# Patient Record
Sex: FEMALE | Race: WHITE | Employment: OTHER | URBAN - METROPOLITAN AREA
[De-identification: names, ages, dates, MRNs, and addresses within clinical notes are randomized per-mention and may not be internally consistent; named-entity substitution may affect disease eponyms.]

---

## 2018-10-09 PROBLEM — M54.9 BACK PAIN: Status: ACTIVE | Noted: 2018-10-09

## 2018-10-09 PROBLEM — I10 HYPERTENSION: Status: ACTIVE | Noted: 2018-10-09

## 2018-10-09 PROBLEM — N39.0 LOWER URINARY TRACT INFECTION: Status: ACTIVE | Noted: 2018-10-09

## 2018-10-09 PROBLEM — M19.90 DEGENERATIVE ARTHRITIS: Status: ACTIVE | Noted: 2018-10-09

## 2018-10-09 PROBLEM — E78.5 HYPERLIPIDEMIA: Status: ACTIVE | Noted: 2018-10-09

## 2018-10-09 PROBLEM — Z78.0 ASYMPTOMATIC POSTMENOPAUSAL STATE: Status: ACTIVE | Noted: 2018-10-09

## 2018-10-09 PROBLEM — R10.30 GROIN PAIN: Status: ACTIVE | Noted: 2018-10-09

## 2018-10-09 PROBLEM — R31.29 MICROSCOPIC HEMATURIA: Status: ACTIVE | Noted: 2018-10-09

## 2018-10-09 PROBLEM — F41.1 GAD (GENERALIZED ANXIETY DISORDER): Status: ACTIVE | Noted: 2018-10-09

## 2018-10-09 PROBLEM — M85.80 OSTEOPENIA: Status: ACTIVE | Noted: 2018-10-09

## 2018-10-09 RX ORDER — SERTRALINE HYDROCHLORIDE 100 MG/1
TABLET, FILM COATED ORAL DAILY
COMMUNITY
End: 2018-10-10 | Stop reason: ALTCHOICE

## 2018-10-09 RX ORDER — OMEPRAZOLE 20 MG/1
20 CAPSULE, DELAYED RELEASE ORAL DAILY
COMMUNITY
End: 2021-01-05 | Stop reason: SDUPTHER

## 2018-10-09 RX ORDER — LOSARTAN POTASSIUM 25 MG/1
TABLET ORAL DAILY
COMMUNITY
End: 2018-10-10 | Stop reason: SDUPTHER

## 2018-10-10 ENCOUNTER — OFFICE VISIT (OUTPATIENT)
Dept: FAMILY MEDICINE CLINIC | Age: 63
End: 2018-10-10

## 2018-10-10 VITALS
BODY MASS INDEX: 31.23 KG/M2 | HEART RATE: 77 BPM | HEIGHT: 67 IN | TEMPERATURE: 98.9 F | DIASTOLIC BLOOD PRESSURE: 84 MMHG | OXYGEN SATURATION: 96 % | WEIGHT: 199 LBS | SYSTOLIC BLOOD PRESSURE: 145 MMHG

## 2018-10-10 DIAGNOSIS — I10 ESSENTIAL HYPERTENSION: Primary | ICD-10-CM

## 2018-10-10 RX ORDER — LOSARTAN POTASSIUM 50 MG/1
50 TABLET ORAL DAILY
Qty: 90 TAB | Refills: 1 | Status: SHIPPED | OUTPATIENT
Start: 2018-10-10 | End: 2019-03-26 | Stop reason: SDUPTHER

## 2018-10-10 NOTE — PATIENT INSTRUCTIONS
High Blood Pressure: Care Instructions  Your Care Instructions    If your blood pressure is usually above 130/80, you have high blood pressure, or hypertension. That means the top number is 130 or higher or the bottom number is 80 or higher, or both. Despite what a lot of people think, high blood pressure usually doesn't cause headaches or make you feel dizzy or lightheaded. It usually has no symptoms. But it does increase your risk for heart attack, stroke, and kidney or eye damage. The higher your blood pressure, the more your risk increases. Your doctor will give you a goal for your blood pressure. Your goal will be based on your health and your age. Lifestyle changes, such as eating healthy and being active, are always important to help lower blood pressure. You might also take medicine to reach your blood pressure goal.  Follow-up care is a key part of your treatment and safety. Be sure to make and go to all appointments, and call your doctor if you are having problems. It's also a good idea to know your test results and keep a list of the medicines you take. How can you care for yourself at home? Medical treatment  · If you stop taking your medicine, your blood pressure will go back up. You may take one or more types of medicine to lower your blood pressure. Be safe with medicines. Take your medicine exactly as prescribed. Call your doctor if you think you are having a problem with your medicine. · Talk to your doctor before you start taking aspirin every day. Aspirin can help certain people lower their risk of a heart attack or stroke. But taking aspirin isn't right for everyone, because it can cause serious bleeding. · See your doctor regularly. You may need to see the doctor more often at first or until your blood pressure comes down. · If you are taking blood pressure medicine, talk to your doctor before you take decongestants or anti-inflammatory medicine, such as ibuprofen.  Some of these medicines can raise blood pressure. · Learn how to check your blood pressure at home. Lifestyle changes  · Stay at a healthy weight. This is especially important if you put on weight around the waist. Losing even 10 pounds can help you lower your blood pressure. · If your doctor recommends it, get more exercise. Walking is a good choice. Bit by bit, increase the amount you walk every day. Try for at least 30 minutes on most days of the week. You also may want to swim, bike, or do other activities. · Avoid or limit alcohol. Talk to your doctor about whether you can drink any alcohol. · Try to limit how much sodium you eat to less than 2,300 milligrams (mg) a day. Your doctor may ask you to try to eat less than 1,500 mg a day. · Eat plenty of fruits (such as bananas and oranges), vegetables, legumes, whole grains, and low-fat dairy products. · Lower the amount of saturated fat in your diet. Saturated fat is found in animal products such as milk, cheese, and meat. Limiting these foods may help you lose weight and also lower your risk for heart disease. · Do not smoke. Smoking increases your risk for heart attack and stroke. If you need help quitting, talk to your doctor about stop-smoking programs and medicines. These can increase your chances of quitting for good. When should you call for help? Call 911 anytime you think you may need emergency care. This may mean having symptoms that suggest that your blood pressure is causing a serious heart or blood vessel problem. Your blood pressure may be over 180/120.   For example, call 911 if:    · You have symptoms of a heart attack. These may include:  ¨ Chest pain or pressure, or a strange feeling in the chest.  ¨ Sweating. ¨ Shortness of breath. ¨ Nausea or vomiting. ¨ Pain, pressure, or a strange feeling in the back, neck, jaw, or upper belly or in one or both shoulders or arms. ¨ Lightheadedness or sudden weakness.   ¨ A fast or irregular heartbeat.     · You have symptoms of a stroke. These may include:  ¨ Sudden numbness, tingling, weakness, or loss of movement in your face, arm, or leg, especially on only one side of your body. ¨ Sudden vision changes. ¨ Sudden trouble speaking. ¨ Sudden confusion or trouble understanding simple statements. ¨ Sudden problems with walking or balance. ¨ A sudden, severe headache that is different from past headaches.     · You have severe back or belly pain.    Do not wait until your blood pressure comes down on its own. Get help right away.   Call your doctor now or seek immediate care if:    · Your blood pressure is much higher than normal (such as 180/120 or higher), but you don't have symptoms.     · You think high blood pressure is causing symptoms, such as:  ¨ Severe headache. ¨ Blurry vision.    Watch closely for changes in your health, and be sure to contact your doctor if:    · Your blood pressure measures higher than your doctor recommends at least 2 times. That means the top number is higher or the bottom number is higher, or both.     · You think you may be having side effects from your blood pressure medicine. Where can you learn more? Go to http://sienna-abel.info/. Enter Z478 in the search box to learn more about \"High Blood Pressure: Care Instructions. \"  Current as of: December 6, 2017  Content Version: 11.8  © 9466-8183 [employer]. Care instructions adapted under license by [employer] (which disclaims liability or warranty for this information). If you have questions about a medical condition or this instruction, always ask your healthcare professional. [place] [address] any warranty or liability for your use of this information.

## 2018-10-10 NOTE — PROGRESS NOTES
Assessment/Plan:     Diagnoses and all orders for this visit:    1. Essential hypertension  -     losartan (COZAAR) 50 mg tablet; Take 1 Tab by mouth daily.  - Unchanged, increase losartan to 50 mg daily and continue to monitor BP, follow up as needed. Continue to work on diet and exercise        Follow-up Disposition:  Return in about 6 months (around 4/10/2019) for Follow Up. Discussed expected course/resolution/complications of diagnosis in detail with patient.    Medication risks/benefits/costs/interactions/alternatives discussed with patient.    Pt was given after visit summary which includes diagnoses, current medications & vitals. Pt expressed understanding with the diagnosis and plan        Subjective:      Richard Reyes is a 70 y.o. female who presents for had concerns including Medication Refill. Here today for hypertension follow up. Started on Losartan 25mg about a month ago. Reports BP readings in the 150's and after taking Losartan sees numbers 120's-130's. Exercises 5-7 times a week. Has lost 30-40lbs since last year. Denies CP or SOB. Needs a refill today. Current Outpatient Prescriptions   Medication Sig Dispense Refill    losartan (COZAAR) 50 mg tablet Take 1 Tab by mouth daily. 90 Tab 1    omeprazole (PRILOSEC) 20 mg capsule Take 20 mg by mouth daily. Allergies   Allergen Reactions    Ciprofibrate Unknown (comments)       ROS:   Review of Systems   Respiratory: Negative for shortness of breath. Cardiovascular: Negative for chest pain and leg swelling. Neurological: Negative for dizziness and headaches. Objective:     Visit Vitals    /84 (BP 1 Location: Left arm, BP Patient Position: Sitting)    Pulse 77    Temp 98.9 °F (37.2 °C) (Oral)    Ht 5' 7\" (1.702 m)    Wt 199 lb (90.3 kg)    LMP  (LMP Unknown)    SpO2 96%    BMI 31.17 kg/m2       Vitals and Nurse Documentation reviewed.      Physical Exam   Constitutional: She is well-developed, well-nourished, and in no distress. No distress. HENT:   Head: Normocephalic and atraumatic. Cardiovascular: Normal rate, regular rhythm and normal heart sounds. Exam reveals no gallop and no friction rub. No murmur heard. Pulmonary/Chest: Effort normal and breath sounds normal. No respiratory distress. She has no wheezes. She has no rales. Neurological: She is alert. Skin: She is not diaphoretic. Psychiatric: Affect normal.       No results found for this or any previous visit.

## 2018-10-10 NOTE — MR AVS SNAPSHOT
[address] [facility] 
 
 
 [address] [place], [place] [address] [place] [address] 
[phone] Patient: Melania Arrington MRN: [id number] YMI:[id number] Visit Information Date & Time Provider Department Dept. Phone Encounter #  
 10/10/2018 11:15 AM [place] [id number] [facility] [address]  [address] [phone] Follow-up Instructions Return in about 6 months (around 4/10/2019) for Follow Up. Upcoming Health Maintenance Date Due Hepatitis C Screening 1955 DTaP/Tdap/Td series (1 - Tdap) 7/14/1974 Shingrix Vaccine Age 50> (1 of 2) 7/14/2003 BREAST CANCER SCRN MAMMOGRAM 7/14/2003 FOBT Q 1 YEAR AGE 50-75 7/14/2003 GLAUCOMA SCREENING Q2Y 7/14/2018 Bone Densitometry (Dexa) Screening 7/14/2018 Pneumococcal 65+ Low/Medium Risk (1 of 2 - PCV13) 7/14/2018 Influenza Age 5 to Adult 8/1/2018 Allergies as of 10/10/2018  Review Complete On: 10/10/2018 By: Cinthya Hough NP Severity Noted Reaction Type Reactions Ciprofibrate  10/09/2018    Unknown (comments) Current Immunizations  Reviewed on 10/9/2018 No immunizations on file. Not reviewed this visit You Were Diagnosed With   
  
 Codes Comments Essential hypertension    -  Primary ICD-10-CM: I10 
ICD-9-CM: 401.9 Vitals BP Pulse Temp Height(growth percentile) Weight(growth percentile) LMP  
 145/84 (BP 1 Location: Left arm, BP Patient Position: Sitting) 77 98.9 °F (37.2 °C) (Oral) 5' 7\" (1.702 m) 199 lb (90.3 kg) (LMP Unknown) SpO2 BMI Smoking Status 96% 31.17 kg/m2 Never Smoker BMI and BSA Data Body Mass Index Body Surface Area  
 31.17 kg/m 2 2.07 m 2 Preferred Pharmacy Pharmacy Name Phone [facility] Marya Chang [phone] Your Updated Medication List  
  
   
This list is accurate as of 10/10/18 11:35 AM.  Always use your most recent med list.  
  
  
  
 losartan 50 mg tablet Commonly known as:  COZAAR Take 1 Tab by mouth daily. omeprazole 20 mg capsule Commonly known as:  PRILOSEC Take 20 mg by mouth daily. Prescriptions Sent to Pharmacy Refills  
 losartan (COZAAR) 50 mg tablet 1 Sig: Take 1 Tab by mouth daily. Class: Normal  
 Pharmacy: [id number] Lily Carter 8  #: [phone] Route: Oral  
  
Follow-up Instructions Return in about 6 months (around 4/10/2019) for Follow Up. Patient Instructions High Blood Pressure: Care Instructions Your Care Instructions If your blood pressure is usually above 130/80, you have high blood pressure, or hypertension. That means the top number is 130 or higher or the bottom number is 80 or higher, or both. Despite what a lot of people think, high blood pressure usually doesn't cause headaches or make you feel dizzy or lightheaded. It usually has no symptoms. But it does increase your risk for heart attack, stroke, and kidney or eye damage. The higher your blood pressure, the more your risk increases. Your doctor will give you a goal for your blood pressure. Your goal will be based on your health and your age. Lifestyle changes, such as eating healthy and being active, are always important to help lower blood pressure. You might also take medicine to reach your blood pressure goal. 
Follow-up care is a key part of your treatment and safety. Be sure to make and go to all appointments, and call your doctor if you are having problems. It's also a good idea to know your test results and keep a list of the medicines you take. How can you care for yourself at home? Medical treatment · If you stop taking your medicine, your blood pressure will go back up. You may take one or more types of medicine to lower your blood pressure. Be safe with medicines. Take your medicine exactly as prescribed.  Call your doctor if you think you are having a problem with your medicine. · Talk to your doctor before you start taking aspirin every day. Aspirin can help certain people lower their risk of a heart attack or stroke. But taking aspirin isn't right for everyone, because it can cause serious bleeding. · See your doctor regularly. You may need to see the doctor more often at first or until your blood pressure comes down. · If you are taking blood pressure medicine, talk to your doctor before you take decongestants or anti-inflammatory medicine, such as ibuprofen. Some of these medicines can raise blood pressure. · Learn how to check your blood pressure at home. Lifestyle changes · Stay at a healthy weight. This is especially important if you put on weight around the waist. Losing even 10 pounds can help you lower your blood pressure. · If your doctor recommends it, get more exercise. Walking is a good choice. Bit by bit, increase the amount you walk every day. Try for at least 30 minutes on most days of the week. You also may want to swim, bike, or do other activities. · Avoid or limit alcohol. Talk to your doctor about whether you can drink any alcohol. · Try to limit how much sodium you eat to less than 2,300 milligrams (mg) a day. Your doctor may ask you to try to eat less than 1,500 mg a day. · Eat plenty of fruits (such as bananas and oranges), vegetables, legumes, whole grains, and low-fat dairy products. · Lower the amount of saturated fat in your diet. Saturated fat is found in animal products such as milk, cheese, and meat. Limiting these foods may help you lose weight and also lower your risk for heart disease. · Do not smoke. Smoking increases your risk for heart attack and stroke. If you need help quitting, talk to your doctor about stop-smoking programs and medicines. These can increase your chances of quitting for good. When should you call for help? Call 911 anytime you think you may need emergency care. This may mean having symptoms that suggest that your blood pressure is causing a serious heart or blood vessel problem. Your blood pressure may be over 180/120. 
 For example, call 911 if: 
  · You have symptoms of a heart attack. These may include: ¨ Chest pain or pressure, or a strange feeling in the chest. 
¨ Sweating. ¨ Shortness of breath. ¨ Nausea or vomiting. ¨ Pain, pressure, or a strange feeling in the back, neck, jaw, or upper belly or in one or both shoulders or arms. ¨ Lightheadedness or sudden weakness. ¨ A fast or irregular heartbeat.  
  · You have symptoms of a stroke. These may include: 
¨ Sudden numbness, tingling, weakness, or loss of movement in your face, arm, or leg, especially on only one side of your body. ¨ Sudden vision changes. ¨ Sudden trouble speaking. ¨ Sudden confusion or trouble understanding simple statements. ¨ Sudden problems with walking or balance. ¨ A sudden, severe headache that is different from past headaches.  
  · You have severe back or belly pain.  
 Do not wait until your blood pressure comes down on its own. Get help right away. 
 Call your doctor now or seek immediate care if: 
  · Your blood pressure is much higher than normal (such as 180/120 or higher), but you don't have symptoms.  
  · You think high blood pressure is causing symptoms, such as: ¨ Severe headache. ¨ Blurry vision.  
 Watch closely for changes in your health, and be sure to contact your doctor if: 
  · Your blood pressure measures higher than your doctor recommends at least 2 times. That means the top number is higher or the bottom number is higher, or both.  
  · You think you may be having side effects from your blood pressure medicine. Where can you learn more? Go to http://sienna-abel.info/. Enter B725 in the search box to learn more about \"High Blood Pressure: Care Instructions. \" 
 Current as of: December 6, 2017 Content Version: 11.8 © 5896-6528 [employer], [employer]. Care instructions adapted under license by [employer] (which disclaims liability or warranty for this information). If you have questions about a medical condition or this instruction, always ask your healthcare professional. [address] any warranty or liability for your use of this information. Introducing [facility]! [facility] introduces [employer] patient portal. Now you can access parts of your medical record, email your doctor's office, and request medication refills online. 1. In your internet browser, go to [email or web address] 2. Click on the First Time User? Click Here link in the Sign In box. You will see the New Member Sign Up page. 3. Enter your Ecowell Access Code exactly as it appears below. You will not need to use this code after youve completed the sign-up process. If you do not sign up before the expiration date, you must request a new code. · Ecowell Access Code: [email or web address] Expires: 1/8/2019 11:34 AM 
 
4. Enter the last four digits of your Social Security Number (xxxx) and Date of Birth (mm/dd/yyyy) as indicated and click Submit. You will be taken to the next sign-up page. 5. Create a Ecowell ID. This will be your Ecowell login ID and cannot be changed, so think of one that is secure and easy to remember. 6. Create a Ecowell password. You can change your password at any time. 7. Enter your Password Reset Question and Answer. This can be used at a later time if you forget your password. 8. Enter your e-mail address. You will receive e-mail notification when new information is available in [address] 9. Click Sign Up. You can now view and download portions of your medical record. 10. Click the Download Summary menu link to download a portable copy of your medical information. If you have questions, please visit the Frequently Asked Questions section of the MeFeed[employer] website. Remember, [employer]ch[employer] is NOT to be used for urgent needs. For medical emergencies, dial 911. Now available from your iPhone and Android! Please provide this summary of care documentation to your next provider. Your primary care clinician is listed as Tory Meza. If you have any questions after today's visit, please call [phone].

## 2018-10-10 NOTE — PROGRESS NOTES
Compa Vann is a 70 y.o. female      Chief Complaint   Patient presents with    Medication Refill         1. Have you been to the ER, urgent care clinic since your last visit? Hospitalized since your last visit?  no    2. Have you seen or consulted any other health care providers outside of the [address] [place] [address] since your last visit? Include any pap smears or colon screening.    no

## 2018-11-05 ENCOUNTER — OFFICE VISIT (OUTPATIENT)
Dept: FAMILY MEDICINE CLINIC | Age: 63
End: 2018-11-05

## 2018-11-05 ENCOUNTER — TELEPHONE (OUTPATIENT)
Dept: FAMILY MEDICINE CLINIC | Age: 63
End: 2018-11-05

## 2018-11-05 ENCOUNTER — HOSPITAL ENCOUNTER (OUTPATIENT)
Dept: GENERAL RADIOLOGY | Age: 63
Discharge: HOME | End: 2018-11-05
Attending: STUDENT IN AN ORGANIZED HEALTH CARE EDUCATION/TRAINING PROGRAM
Payer: MEDICARE

## 2018-11-05 VITALS
SYSTOLIC BLOOD PRESSURE: 149 MMHG | OXYGEN SATURATION: 100 % | TEMPERATURE: 98.1 F | BODY MASS INDEX: 31.55 KG/M2 | RESPIRATION RATE: 16 BRPM | DIASTOLIC BLOOD PRESSURE: 95 MMHG | HEIGHT: 67 IN | HEART RATE: 70 BPM | WEIGHT: 201 LBS

## 2018-11-05 DIAGNOSIS — R07.81 RIB PAIN: Primary | ICD-10-CM
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
## 2018-11-05 DIAGNOSIS — R07.81 RIB PAIN: ICD-10-CM

## 2018-11-05 DIAGNOSIS — S22.000D CLOSED COMPRESSION FRACTURE OF THORACIC VERTEBRA WITH ROUTINE HEALING, SUBSEQUENT ENCOUNTER: Primary | ICD-10-CM

## 2018-11-05 PROCEDURE — 71101 X-RAY EXAM UNILAT RIBS/CHEST: CPT

## 2018-11-05 NOTE — PATIENT INSTRUCTIONS
Go to [facility] to obtain an X-ray of your left ribs to determine if you have a fracture. You will be called with the results of the X-ray.

## 2018-11-05 NOTE — PROGRESS NOTES
Rib X-ray negative for rib fracture but did show incidental finding of T10-T12 compression fractures. Patient reports being told previously that she had a compression fracture after a fall. Imaging also shows diffuse osteoporosis.

## 2018-11-05 NOTE — PROGRESS NOTES
CC: Chest wall Pain after a fall     HPI:    Patient reports falling last Thursday after tripping on a step up while walking into her garage. She reports landing on her left chest. Denies any head injury or LOC. She has been having discomfort on her left chest as well as along the lower aspect of her left braw line. The pain is pleuritic and noted to be a 5/10 with deep breaths. She has been taking ibuprofen, 4 OTC tablets BID. From the fall she states she had some abrasions along her hands and left knee as well. Review of Systems:    Constitutional: negative for Fever or chills  CV: negative for CP or  palpitations  Resp: negative for SOB, Cough, Wheezing  GI: negative for abdominal pain, n/v/d/c       Current Outpatient Medications:     losartan (COZAAR) 50 mg tablet, Take 1 Tab by mouth daily. , Disp: 90 Tab, Rfl: 1    omeprazole (PRILOSEC) 20 mg capsule, Take 20 mg by mouth daily. , Disp: , Rfl:        ALLERGIES - REVIEWED   Allergies   Allergen Reactions    Ciprofibrate Unknown (comments)       PAST MEDICAL HISTORY - REVIEWED   Past Medical History:   Diagnosis Date    Cataracts, bilateral     left eye surgery 2004 and right 11/2014    Esophageal stricture     s/p dilataion 2013    Gastroesophageal reflux disease     Hyperlipemia diet-controlled    Recurrent UTI     Thyroid nodule     dx'ed 1980's        SOCIAL HISTORY - REVIEWED   Social History     Socioeconomic History    Marital status:      Spouse name: Not on file    Number of children: Not on file    Years of education: Not on file    Highest education level: Not on file   Social Needs    Financial resource strain: Not on file    Food insecurity - worry: Not on file    Food insecurity - inability: Not on file   [email or web address] needs - medical: Not on file   [email or web address] needs - non-medical: Not on file   Occupational History    Not on file   Tobacco Use    Smoking status: Never Smoker    Smokeless tobacco: Never Used Substance and Sexual Activity    Alcohol use: Yes    Drug use: No    Sexual activity: Yes   Other Topics Concern    Not on file   Social History Narrative    Not on file        FAMILY HISTORY - REVIEWED   Family History   Problem Relation Age of Onset    Lung Cancer Mother          Physical Exam:     Visit Vitals  BP (!) 149/95 (BP 1 Location: Right arm, BP Patient Position: Sitting)   Pulse 70   Temp 98.1 °F (36.7 °C) (Oral)   Resp 16   Ht 5' 7\" (1.702 m)   Wt 201 lb (91.2 kg)   SpO2 100%   BMI 31.48 kg/m²       General appearance: alert, oriented, NAD   HEENT: PERRLA, moist mucus membranes, no LAD  CV/CHEST: RRR, S1/S2 heard, no m/r/g, TTP of superior aspect of Left chest and along rib cage below left braw line   Resp: CTAB, no w/r/r  Abdominal: soft, non-tender to palpation, +BS  Extremities: no swelling or edema   Skin: no visible rashes      Assessment/Plan:   1. Rib pain: 62yo F presents with left chest wall pain after a fall. Physical exam notable for TTP of left chest and left lower rib cage. -Will obtain left rib x-ray to rule out fracture  -Patient advised to take aleve twice daily as needed for pain and to apply heat    2.  Fall, initial encounter: Fall was mechanical in nature following tripping over a step up in patient's garage, without LOC or head injury.   -treat pain conservatively as stated above      Patient discussed with Dr. Stacie Holter, MD  Family Medicine Resident

## 2018-11-05 NOTE — PROGRESS NOTES
Isacc Wilson is a 70 y.o. female      Chief Complaint   Patient presents with    Fall      last Thursday          1. Have you been to the ER, urgent care clinic since your last visit? Hospitalized since your last visit? No      2. Have you seen or consulted any other health care providers outside of the [address] [place] [address] since your last visit? Include any pap smears or colon screening.    No

## 2018-11-05 NOTE — TELEPHONE ENCOUNTER
Called patient with regards to X-ray results. X-ray negative for rib fracture but did show T10-T12 compression fracture and diffuse osteoporosis. Discussed findings with patient. She reports having a previous fall and [facility] showed she had a compression fracture which she believes is the same as was found in the current X-ray. She did not have further imaging done and currently does not want further imaging of her back. Discussed the benefit of Dexa scan to evaluate for osteoporosis. Patient states she would be willing to undergo the testing.        Jose Matos MD  Family Medicine Resident

## 2018-11-06 NOTE — PROGRESS NOTES
I have reviewed the notes, assessments, and/or procedures performed, I concur with the residents documentation of Anisa Gann.

## 2018-11-06 NOTE — TELEPHONE ENCOUNTER
Merle with [facility] Radiology Dept called to ask if MD was aware of xray results of 11/5/18.  Per below notes Dr. Henri Tsai aware and have discussed with patient

## 2019-03-26 ENCOUNTER — OFFICE VISIT (OUTPATIENT)
Dept: FAMILY MEDICINE CLINIC | Age: 64
End: 2019-03-26

## 2019-03-26 VITALS
RESPIRATION RATE: 18 BRPM | SYSTOLIC BLOOD PRESSURE: 117 MMHG | HEART RATE: 69 BPM | OXYGEN SATURATION: 100 % | WEIGHT: 202 LBS | HEIGHT: 67 IN | TEMPERATURE: 98.4 F | DIASTOLIC BLOOD PRESSURE: 76 MMHG | BODY MASS INDEX: 31.71 KG/M2

## 2019-03-26 DIAGNOSIS — E78.5 HYPERLIPIDEMIA, UNSPECIFIED HYPERLIPIDEMIA TYPE: ICD-10-CM

## 2019-03-26 DIAGNOSIS — L30.9 ECZEMA, UNSPECIFIED TYPE: ICD-10-CM

## 2019-03-26 DIAGNOSIS — I10 ESSENTIAL HYPERTENSION: Primary | ICD-10-CM

## 2019-03-26 RX ORDER — DIAPER,BRIEF,INFANT-TODD,DISP
EACH MISCELLANEOUS 2 TIMES DAILY
Qty: 30 G | Refills: 0 | Status: SHIPPED | OUTPATIENT
Start: 2019-03-26 | End: 2019-09-27 | Stop reason: ALTCHOICE

## 2019-03-26 RX ORDER — LOSARTAN POTASSIUM 50 MG/1
50 TABLET ORAL DAILY
Qty: 90 TAB | Refills: 1 | Status: SHIPPED | OUTPATIENT
Start: 2019-03-26 | End: 2019-09-27 | Stop reason: SDUPTHER

## 2019-03-26 NOTE — PATIENT INSTRUCTIONS

## 2019-03-26 NOTE — PROGRESS NOTES
Gabriella Michaels is a 70 y.o. female      Chief Complaint   Patient presents with    Medication Refill     Losartan         1. Have you been to the ER, urgent care clinic since your last visit? Hospitalized since your last visit? no       2. Have you seen or consulted any other health care providers outside of the [address] [place] [address] since your last visit? Include any pap smears or colon screening.   no

## 2019-03-26 NOTE — PROGRESS NOTES
Assessment/Plan:     Diagnoses and all orders for this visit:    1. Essential hypertension  -     losartan (COZAAR) 50 mg tablet; Take 1 Tab by mouth daily.  -     METABOLIC PANEL, COMPREHENSIVE  - Stable, labs today, continue current therapy    2. Eczema, unspecified type  -     hydrocortisone (CORTAID) 0.5 % topical cream; Apply  to affected area two (2) times a day. use thin layer  - Unchanged, start hydrocortisone today with aquafor or cetephil applied daily. Follow up if symptoms do not improve in the next week or so    3. Hyperlipidemia, unspecified hyperlipidemia type  -     LIPID PANEL  - Presumed stable, labs today        Follow-up and Dispositions    · Return in about 3 months (around 7/1/2019) for CPE. Discussed expected course/resolution/complications of diagnosis in detail with patient.    Medication risks/benefits/costs/interactions/alternatives discussed with patient.    Pt was given after visit summary which includes diagnoses, current medications & vitals. Pt expressed understanding with the diagnosis and plan        Subjective:      Nina Desouza is a 70 y.o. female who presents for had concerns including Medication Refill (Losartan). Here today for hypertension follow up. BP at goal today. Takes losartan 50mg daily. Does not take BP at home. Denies CP, SOB, palpitations or leg swelling. Has a history of elevated cholesterol, not on meds. Due for CPE in July. Eczema  Bilateral elbows for about a month, not getting better. States it itches some. Using lotion at home with no resolution. No history of eczema in the past.     Current Outpatient Medications   Medication Sig Dispense Refill    losartan (COZAAR) 50 mg tablet Take 1 Tab by mouth daily. 90 Tab 1    hydrocortisone (CORTAID) 0.5 % topical cream Apply  to affected area two (2) times a day. use thin layer 30 g 0    omeprazole (PRILOSEC) 20 mg capsule Take 20 mg by mouth daily.          Allergies   Allergen Reactions    Ciprofibrate Unknown (comments)       ROS:   ROS    Objective:     Visit Vitals  /76 (BP 1 Location: Right arm, BP Patient Position: Sitting)   Pulse 69   Temp 98.4 °F (36.9 °C) (Oral)   Resp 18   Ht 5' 7\" (1.702 m)   Wt 202 lb (91.6 kg)   LMP  (LMP Unknown)   SpO2 100%   BMI 31.64 kg/m²       Vitals and Nurse Documentation reviewed. Physical Exam   Constitutional: She is well-developed, well-nourished, and in no distress. No distress. HENT:   Head: Normocephalic and atraumatic. Cardiovascular: Normal rate, regular rhythm and normal heart sounds. Exam reveals no gallop and no friction rub. No murmur heard. Pulmonary/Chest: Effort normal and breath sounds normal. No respiratory distress. She has no wheezes. She has no rales. Neurological: She is alert. Skin: She is not diaphoretic. Psychiatric: Affect normal.       No results found for this or any previous visit.

## 2019-04-17 LAB
ALBUMIN SERPL-MCNC: 4.6 G/DL (ref 3.6–4.8)
ALBUMIN/GLOB SERPL: 2.1 {RATIO} (ref 1.2–2.2)
ALP SERPL-CCNC: 107 IU/L (ref 39–117)
ALT SERPL-CCNC: 34 IU/L (ref 0–32)
AST SERPL-CCNC: 19 IU/L (ref 0–40)
BILIRUB SERPL-MCNC: 0.4 MG/DL (ref 0–1.2)
BUN SERPL-MCNC: 16 MG/DL (ref 8–27)
BUN/CREAT SERPL: 26 (ref 12–28)
CALCIUM SERPL-MCNC: 9.3 MG/DL (ref 8.7–10.3)
CHLORIDE SERPL-SCNC: 105 MMOL/L (ref 96–106)
CHOLEST SERPL-MCNC: 226 MG/DL (ref 100–199)
CO2 SERPL-SCNC: 22 MMOL/L (ref 20–29)
CREAT SERPL-MCNC: 0.62 MG/DL (ref 0.57–1)
GLOBULIN SER CALC-MCNC: 2.2 G/DL (ref 1.5–4.5)
GLUCOSE SERPL-MCNC: 86 MG/DL (ref 65–99)
HDLC SERPL-MCNC: 48 MG/DL
LDLC SERPL CALC-MCNC: 150 MG/DL (ref 0–99)
POTASSIUM SERPL-SCNC: 5 MMOL/L (ref 3.5–5.2)
PROT SERPL-MCNC: 6.8 G/DL (ref 6–8.5)
SODIUM SERPL-SCNC: 142 MMOL/L (ref 134–144)
TRIGL SERPL-MCNC: 142 MG/DL (ref 0–149)
VLDLC SERPL CALC-MCNC: 28 MG/DL (ref 5–40)

## 2019-05-22 ENCOUNTER — OFFICE VISIT (OUTPATIENT)
Dept: FAMILY MEDICINE CLINIC | Age: 64
End: 2019-05-22

## 2019-05-22 VITALS
OXYGEN SATURATION: 97 % | TEMPERATURE: 98.5 F | HEART RATE: 60 BPM | SYSTOLIC BLOOD PRESSURE: 123 MMHG | WEIGHT: 202 LBS | DIASTOLIC BLOOD PRESSURE: 85 MMHG | BODY MASS INDEX: 31.71 KG/M2 | RESPIRATION RATE: 16 BRPM | HEIGHT: 67 IN

## 2019-05-22 DIAGNOSIS — E78.5 HYPERLIPIDEMIA, UNSPECIFIED HYPERLIPIDEMIA TYPE: Primary | ICD-10-CM

## 2019-05-22 RX ORDER — NEOMYCIN SULFATE, POLYMYXIN B SULFATE AND DEXAMETHASONE 3.5; 10000; 1 MG/ML; [USP'U]/ML; MG/ML
SUSPENSION/ DROPS OPHTHALMIC
COMMUNITY
Start: 2019-05-20 | End: 2019-09-27 | Stop reason: ALTCHOICE

## 2019-05-22 RX ORDER — PRAVASTATIN SODIUM 20 MG/1
20 TABLET ORAL
Qty: 90 TAB | Refills: 1 | Status: SHIPPED | OUTPATIENT
Start: 2019-05-22 | End: 2019-09-27 | Stop reason: SDUPTHER

## 2019-05-22 NOTE — PROGRESS NOTES
Identified pt with two pt identifiers(name and ). Reviewed record in preparation for visit and have obtained necessary documentation. All patient medications has been reviewed. Chief Complaint   Patient presents with    Cholesterol Problem       Health Maintenance Due   Topic    Hepatitis C Screening     DTaP/Tdap/Td series (1 - Tdap)    Shingrix Vaccine Age 50> (1 of 2)    BREAST CANCER SCRN MAMMOGRAM     FOBT Q 1 YEAR AGE 50-75     GLAUCOMA SCREENING Q2Y     Bone Densitometry (Dexa) Screening     Pneumococcal 65+ years (1 of 2 - PCV13)    MEDICARE YEARLY EXAM      Mammogram: not done     Pneumonia: not done     Shingrix: not done, on waiting list     Eye exam: done with/ Dr. Ezra Alvarado optometrist     Vitals:    19 1007   BP: 123/85   Pulse: 60   Resp: 16   Temp: 98.5 °F (36.9 °C)   TempSrc: Oral   SpO2: 97%   Weight: 202 lb (91.6 kg)   Height: 5' 7\" (1.702 m)   PainSc:   0 - No pain       Coordination of Care Questionnaire:   1) Have you been to an emergency room, urgent care, or hospitalized since your last visit?   no       2. Have seen or consulted any other health care provider since your last visit? NO    3) Do you have an Advanced Directive/ Living Will in place? YES  If yes, do we have a copy on file NO  If no, would you like information NO    Patient is accompanied by self I have received verbal consent from Tapan Nuenz to discuss any/all medical information while they are present in the room.

## 2019-05-22 NOTE — PROGRESS NOTES
Assessment/Plan:     Diagnoses and all orders for this visit:    1. Hyperlipidemia, unspecified hyperlipidemia type  -     pravastatin (PRAVACHOL) 20 mg tablet; Take 1 Tab by mouth nightly. - Worsening, start pravastatin today. Side effects discussed. RTC in 6 months for labs, sooner if needed. Discussed expected course/resolution/complications of diagnosis in detail with patient.    Medication risks/benefits/costs/interactions/alternatives discussed with patient.    Pt was given after visit summary which includes diagnoses, current medications & vitals. Pt expressed understanding with the diagnosis and plan        Subjective:      Verona Wilkerson is a 70 y.o. female who presents for had concerns including Cholesterol Problem. Here today for lab follow up. Cholesterol was elevated. LDL was 150. She has never been on a Statin in the past.  States father  at age 48 for \"cardiac reasons\"  States exercising 5 - 7 days a week. States eats a fairly healthy diet. BP at a goal today. Denies CP, SOB, palpitations or leg swelling. Current Outpatient Medications   Medication Sig Dispense Refill    neomycin-polymyxin-dexamethasone (MAXITROL) ophthalmic suspension       pravastatin (PRAVACHOL) 20 mg tablet Take 1 Tab by mouth nightly. 90 Tab 1    losartan (COZAAR) 50 mg tablet Take 1 Tab by mouth daily. 90 Tab 1    hydrocortisone (CORTAID) 0.5 % topical cream Apply  to affected area two (2) times a day. use thin layer 30 g 0    omeprazole (PRILOSEC) 20 mg capsule Take 20 mg by mouth daily. Allergies   Allergen Reactions    Ciprofibrate Unknown (comments)       ROS:   Review of Systems   Constitutional: Negative for chills, fever and malaise/fatigue. Respiratory: Negative for cough and shortness of breath. Cardiovascular: Negative for chest pain, palpitations and leg swelling. Musculoskeletal: Negative for joint pain. Neurological: Negative for dizziness and headaches. Psychiatric/Behavioral: Negative for depression. The patient is not nervous/anxious. Objective:     Visit Vitals  /85 (BP 1 Location: Left arm, BP Patient Position: Sitting)   Pulse 60   Temp 98.5 °F (36.9 °C) (Oral)   Resp 16   Ht 5' 7\" (1.702 m)   Wt 202 lb (91.6 kg)   LMP  (LMP Unknown)   SpO2 97%   BMI 31.64 kg/m²       Vitals and Nurse Documentation reviewed. Physical Exam   Constitutional: She is well-developed, well-nourished, and in no distress. No distress. HENT:   Head: Normocephalic and atraumatic. Cardiovascular: Normal rate, regular rhythm and normal heart sounds. Exam reveals no gallop and no friction rub. No murmur heard. Pulmonary/Chest: Effort normal and breath sounds normal. No respiratory distress. She has no wheezes. She has no rales. Neurological: She is alert. Skin: She is not diaphoretic. Psychiatric: Affect normal.       Results for orders placed or performed in visit on 03/26/19   LIPID PANEL   Result Value Ref Range    Cholesterol, total 226 (H) 100 - 199 mg/dL    Triglyceride 142 0 - 149 mg/dL    HDL Cholesterol 48 >39 mg/dL    VLDL, calculated 28 5 - 40 mg/dL    LDL, calculated 150 (H) 0 - 99 mg/dL   METABOLIC PANEL, COMPREHENSIVE   Result Value Ref Range    Glucose 86 65 - 99 mg/dL    BUN 16 8 - 27 mg/dL    Creatinine 0.62 0.57 - 1.00 mg/dL    GFR est non-AA 95 >59 mL/min/1.73    GFR est  >59 mL/min/1.73    BUN/Creatinine ratio 26 12 - 28    Sodium 142 134 - 144 mmol/L    Potassium 5.0 3.5 - 5.2 mmol/L    Chloride 105 96 - 106 mmol/L    CO2 22 20 - 29 mmol/L    Calcium 9.3 8.7 - 10.3 mg/dL    Protein, total 6.8 6.0 - 8.5 g/dL    Albumin 4.6 3.6 - 4.8 g/dL    GLOBULIN, TOTAL 2.2 1.5 - 4.5 g/dL    A-G Ratio 2.1 1.2 - 2.2    Bilirubin, total 0.4 0.0 - 1.2 mg/dL    Alk.  phosphatase 107 39 - 117 IU/L    AST (SGOT) 19 0 - 40 IU/L    ALT (SGPT) 34 (H) 0 - 32 IU/L

## 2019-09-27 ENCOUNTER — OFFICE VISIT (OUTPATIENT)
Dept: FAMILY MEDICINE CLINIC | Age: 64
End: 2019-09-27

## 2019-09-27 VITALS
BODY MASS INDEX: 32.33 KG/M2 | DIASTOLIC BLOOD PRESSURE: 81 MMHG | HEART RATE: 71 BPM | HEIGHT: 67 IN | OXYGEN SATURATION: 98 % | RESPIRATION RATE: 16 BRPM | SYSTOLIC BLOOD PRESSURE: 137 MMHG | WEIGHT: 206 LBS | TEMPERATURE: 98.1 F

## 2019-09-27 DIAGNOSIS — E78.5 HYPERLIPIDEMIA, UNSPECIFIED HYPERLIPIDEMIA TYPE: ICD-10-CM

## 2019-09-27 DIAGNOSIS — Z13.39 SCREENING FOR ALCOHOLISM: ICD-10-CM

## 2019-09-27 DIAGNOSIS — Z13.31 SCREENING FOR DEPRESSION: ICD-10-CM

## 2019-09-27 DIAGNOSIS — I10 ESSENTIAL HYPERTENSION: ICD-10-CM

## 2019-09-27 DIAGNOSIS — Z11.59 NEED FOR HEPATITIS C SCREENING TEST: ICD-10-CM

## 2019-09-27 DIAGNOSIS — Z00.00 INITIAL MEDICARE ANNUAL WELLNESS VISIT: Primary | ICD-10-CM

## 2019-09-27 RX ORDER — PRAVASTATIN SODIUM 20 MG/1
20 TABLET ORAL
Qty: 90 TAB | Refills: 1 | Status: SHIPPED | OUTPATIENT
Start: 2019-09-27 | End: 2020-01-31 | Stop reason: SDUPTHER

## 2019-09-27 RX ORDER — LOSARTAN POTASSIUM 50 MG/1
50 TABLET ORAL DAILY
Qty: 90 TAB | Refills: 1 | Status: SHIPPED | OUTPATIENT
Start: 2019-09-27 | End: 2020-01-31 | Stop reason: SDUPTHER

## 2019-09-27 NOTE — PROGRESS NOTES
This is an Initial Medicare Annual Wellness Exam (AWV) (Performed 12 months after IPPE or effective date of Medicare Part B enrollment, Once in a lifetime)    I have reviewed the patient's medical history in detail and updated the computerized patient record. History   Here today for medicare wellness. Up to date on colonoscopy. Declines mammogram today. Has had first pneumonia vaccination. Sees dentist every 6 months. Last vision exam is up to date. Goes annually. Does not exercise. States working on [employer] Had lost weight but put some weight back on. HTN  BP at goal today. Takes losartan 50mg as directed with no reported side effects  Denies CP, SOB, palpitations or leg swelling. Hyperlipidemia  Started pravastatin last visit. Takes as directed no reported side effects. Review of Systems   Constitutional: Negative for chills, fever and weight loss. Eyes: Negative for blurred vision. Respiratory: Negative for cough. Cardiovascular: Negative for chest pain and palpitations. Gastrointestinal: Negative for constipation, nausea and vomiting. Genitourinary: Negative for dysuria. Musculoskeletal: Negative for joint pain. Skin: Negative for rash. Neurological: Negative for dizziness and headaches. Psychiatric/Behavioral: Negative for substance abuse and suicidal ideas. The patient does not have insomnia.         Past Medical History:   Diagnosis Date    Cataracts, bilateral     left eye surgery 2004 and right 11/2014    Esophageal stricture     s/p dilataion 2013    Gastroesophageal reflux disease     Hyperlipemia diet-controlled    Recurrent UTI     Thyroid nodule     dx'ed 1980's      Past Surgical History:   Procedure Laterality Date    HX OTHER SURGICAL      BTL 1980    HX OTHER SURGICAL      Vitectomy left eye 2003     HX OTHER SURGICAL      Breast Augmentation   1980's      Current Outpatient Medications   Medication Sig Dispense Refill    losartan (COZAAR) 50 mg tablet Take 1 Tab by mouth daily. 90 Tab 1    pravastatin (PRAVACHOL) 20 mg tablet Take 1 Tab by mouth nightly. 90 Tab 1    omeprazole (PRILOSEC) 20 mg capsule Take 20 mg by mouth daily. Allergies   Allergen Reactions    Ciprofibrate Unknown (comments)     Family History   Problem Relation Age of Onset    Lung Cancer Mother     Cancer Mother     Heart Disease Father      Social History     Tobacco Use    Smoking status: Never Smoker    Smokeless tobacco: Never Used   Substance Use Topics    Alcohol use: Yes     Alcohol/week: 4.0 standard drinks     Types: 4 Glasses of wine per week     Patient Active Problem List   Diagnosis Code    Groin pain R10.30    Hypertension I10    Back pain M54.9    Lower urinary tract infection N39.0       Depression Risk Factor Screening:     3 most recent PHQ Screens 5/22/2019   Little interest or pleasure in doing things Not at all   Feeling down, depressed, irritable, or hopeless Not at all   Total Score PHQ 2 0     Alcohol Risk Factor Screening: You do not drink alcohol or very rarely. You average more than 7 drinks a week. Functional Ability and Level of Safety:     Hearing Loss  Hearing is good. Activities of Daily Living  The home contains: no safety equipment. Patient does total self care    Fall Risk  Fall Risk Assessment, last 12 mths 9/27/2019   Able to walk? Yes   Fall in past 12 months? No   Fall with injury? -   Number of falls in past 12 months -   Fall Risk Score -       Abuse Screen  Patient is not abused    Cognitive Screening   Evaluation of Cognitive Function:  Has your family/caregiver stated any concerns about your memory: no  Normal, Clock Drawing test    Physical Exam   Constitutional: She is well-developed, well-nourished, and in no distress. HENT:   Head: Normocephalic and atraumatic.    Right Ear: Tympanic membrane normal.   Left Ear: Tympanic membrane normal.   Nose: Nose normal.   Mouth/Throat: Oropharynx is clear and moist. Normal dentition. Eyes: Pupils are equal, round, and reactive to light. EOM are normal. Right eye exhibits no discharge. Left eye exhibits no discharge. Right conjunctiva is not injected. Left conjunctiva is not injected. Neck: Neck supple. Carotid bruit is not present. No thyroid mass and no thyromegaly present. Cardiovascular: Normal rate, regular rhythm, S1 normal and S2 normal. Exam reveals no gallop and no friction rub. No murmur heard. Pulses:       Dorsalis pedis pulses are 2+ on the right side, and 2+ on the left side. Posterior tibial pulses are 2+ on the right side, and 2+ on the left side. Pulmonary/Chest: Breath sounds normal.   Abdominal: Normal appearance and bowel sounds are normal. She exhibits no distension and no mass. There is no hepatosplenomegaly. There is no tenderness. Musculoskeletal: Normal range of motion. Lymphadenopathy:     She has no cervical adenopathy. Neurological: She is alert. She has normal sensation and normal strength. Gait normal. Gait normal.   Skin: Skin is warm, dry and intact. No rash noted. Psychiatric: Mood and affect normal.         Patient Care Team   Patient Care Team:  Abhilash Milligan NP as PCP - General (Nurse Practitioner)    Assessment/Plan   Education and counseling provided:  Are appropriate based on today's review and evaluation  Screening Mammography    Diagnoses and all orders for this visit:    1. Essential hypertension  -     losartan (COZAAR) 50 mg tablet; Take 1 Tab by mouth daily.  -     METABOLIC PANEL, COMPREHENSIVE    2. Hyperlipidemia, unspecified hyperlipidemia type  -     pravastatin (PRAVACHOL) 20 mg tablet; Take 1 Tab by mouth nightly. -     LIPID PANEL    3. Initial Medicare annual wellness visit    4. Screening for alcoholism  -     [place] ANNUAL ALCOHOL SCREEN 15 MIN    5. Screening for depression  -     DEPRESSION SCREEN ANNUAL    6.  Need for hepatitis C screening test  -     HEPATITIS C AB         Health Maintenance Due   Topic Date Due    Hepatitis C Screening  1955    DTaP/Tdap/Td series (1 - Tdap) 07/14/1974    Shingrix Vaccine Age 50> (1 of 2) 07/14/2003    FOBT Q 1 YEAR AGE 50-75  07/14/2003    GLAUCOMA SCREENING Q2Y  07/14/2018    Bone Densitometry (Dexa) Screening  07/14/2018    Pneumococcal 65+ years (1 of 2 - PCV13) 07/14/2018    Influenza Age 9 to Adult  08/01/2019

## 2019-09-27 NOTE — PROGRESS NOTES
1. Have you been to the ER, urgent care clinic since your last visit? Hospitalized since your last visit? No    2. Have you seen or consulted any other health care providers outside of the [address] [place] [address] since your last visit? Include any pap smears or colon screening.  No

## 2019-09-27 NOTE — PATIENT INSTRUCTIONS
Medicare Wellness Visit, Female The best way to live healthy is to have a lifestyle where you eat a well-balanced diet, exercise regularly, limit alcohol use, and quit all forms of tobacco/nicotine, if applicable. Regular preventive services are another way to keep healthy. Preventive services (vaccines, screening tests, monitoring & exams) can help personalize your care plan, which helps you manage your own care. Screening tests can find health problems at the earliest stages, when they are easiest to treat. [facility] follows the current, evidence-based guidelines published by the [facility] ([facility]) when recommending preventive services for our patients. Because we follow these guidelines, sometimes recommendations change over time as research supports it. (For example, mammograms used to be recommended annually. Even though Medicare will still pay for an annual mammogram, the newer guidelines recommend a mammogram every two years for women of average risk.) Of course, you and your doctor may decide to screen more often for some diseases, based on your risk and your health status. Preventive services for you include: - Medicare offers their members a free annual wellness visit, which is time for you and your primary care provider to discuss and plan for your preventive service needs. Take advantage of this benefit every year! 
-All adults over the age of 72 should receive the recommended pneumonia vaccines. Current USPSTF guidelines recommend a series of two vaccines for the best pneumonia protection.  
-All adults should have a flu vaccine yearly and a tetanus vaccine every 10 years. All adults age 61 and older should receive a shingles vaccine once in their lifetime.   
-A bone mass density test is recommended when a woman turns 65 to screen for osteoporosis. This test is only recommended one time, as a screening. Some providers will use this same test as a disease monitoring tool if you already have osteoporosis. -All adults age 38-68 who are overweight should have a diabetes screening test once every three years.  
-Other screening tests and preventive services for persons with diabetes include: an eye exam to screen for diabetic retinopathy, a kidney function test, a foot exam, and stricter control over your cholesterol.  
-Cardiovascular screening for adults with routine risk involves an electrocardiogram (ECG) at intervals determined by your doctor.  
-Colorectal cancer screenings should be done for adults age 54-65 with no increased risk factors for colorectal cancer. There are a number of acceptable methods of screening for this type of cancer. Each test has its own benefits and drawbacks. Discuss with your doctor what is most appropriate for you during your annual wellness visit. The different tests include: colonoscopy (considered the best screening method), a fecal occult blood test, a fecal DNA test, and sigmoidoscopy. -Breast cancer screenings are recommended every other year for women of normal risk, age 54-69. 
-Cervical cancer screenings for women over age 72 are only recommended with certain risk factors.  
-All adults born between [facility] should be screened once for Hepatitis C. Here is a list of your current Health Maintenance items (your personalized list of preventive services) with a due date: 
Health Maintenance Due Topic Date Due  
 Hepatitis C Test  1955  DTaP/Tdap/Td  (1 - Tdap) 07/14/1974  Shingles Vaccine (1 of 2) 07/14/2003  Stool testing for trace blood  07/14/2003  Glaucoma Screening   07/14/2018  Bone Mineral Density   07/14/2018  Pneumococcal Vaccine (1 of 2 - PCV13) 07/14/2018 [address] [place] [address] Annual Well Visit  10/10/2018  Flu Vaccine  08/01/2019 High Cholesterol: Care Instructions Your Care Instructions Cholesterol is a type of fat in your blood. It is needed for many body functions, such as making new cells. Cholesterol is made by your body. It also comes from food you eat. High cholesterol means that you have too much of the fat in your blood. This raises your risk of a heart attack and stroke. LDL and HDL are part of your total cholesterol. LDL is the \"bad\" cholesterol. High LDL can raise your risk for heart disease, heart attack, and stroke. HDL is the \"good\" cholesterol. It helps clear bad cholesterol from the body. High HDL is linked with a lower risk of heart disease, heart attack, and stroke. Your cholesterol levels help your doctor find out your risk for having a heart attack or stroke. You and your doctor can talk about whether you need to lower your risk and what treatment is best for you. A heart-healthy lifestyle along with medicines can help lower your cholesterol and your risk. The way you choose to lower your risk will depend on how high your risk is for heart attack and stroke. It will also depend on how you feel about taking medicines. Follow-up care is a key part of your treatment and safety. Be sure to make and go to all appointments, and call your doctor if you are having problems. It's also a good idea to know your test results and keep a list of the medicines you take. How can you care for yourself at home? · Eat a variety of foods every day. Good choices include fruits, vegetables, whole grains (like oatmeal), dried beans and peas, nuts and seeds, soy products (like tofu), and fat-free or low-fat dairy products. · Replace butter, margarine, and hydrogenated or partially hydrogenated oils with olive and canola oils. (Canola oil margarine without trans fat is fine.) · Replace red meat with fish, poultry, and soy protein (like tofu). · Limit processed and packaged foods like chips, crackers, and cookies. · Bake, broil, or steam foods. Don't purdy them. · Be physically active. Get at least 30 minutes of exercise on most days of the week. Walking is a good choice. You also may want to do other activities, such as running, swimming, cycling, or playing tennis or team sports. · Stay at a healthy weight or lose weight by making the changes in eating and physical activity listed above. Losing just a small amount of weight, even 5 to 10 pounds, can reduce your risk for having a heart attack or stroke. · Do not smoke. When should you call for help? Watch closely for changes in your health, and be sure to contact your doctor if: 
  · You need help making lifestyle changes.  
  · You have questions about your medicine. Where can you learn more? Go to http://sienna-abel.info/. Enter W445 in the search box to learn more about \"High Cholesterol: Care Instructions. \" Current as of: April 9, 2019 Content Version: 12.2 © 1658-1405 [employer]. Care instructions adapted under license by [employer] (which disclaims liability or warranty for this information). If you have questions about a medical condition or this instruction, always ask your healthcare professional. [address] any warranty or liability for your use of this information.

## 2019-11-07 LAB
ALBUMIN SERPL-MCNC: 4.8 G/DL (ref 3.6–4.8)
ALBUMIN/GLOB SERPL: 2.2 {RATIO} (ref 1.2–2.2)
ALP SERPL-CCNC: 100 IU/L (ref 39–117)
ALT SERPL-CCNC: 26 IU/L (ref 0–32)
AST SERPL-CCNC: 27 IU/L (ref 0–40)
BILIRUB SERPL-MCNC: 0.5 MG/DL (ref 0–1.2)
BUN SERPL-MCNC: 17 MG/DL (ref 8–27)
BUN/CREAT SERPL: 22 (ref 12–28)
CALCIUM SERPL-MCNC: 9.3 MG/DL (ref 8.7–10.3)
CHLORIDE SERPL-SCNC: 101 MMOL/L (ref 96–106)
CHOLEST SERPL-MCNC: 203 MG/DL (ref 100–199)
CO2 SERPL-SCNC: 22 MMOL/L (ref 20–29)
CREAT SERPL-MCNC: 0.79 MG/DL (ref 0.57–1)
GLOBULIN SER CALC-MCNC: 2.2 G/DL (ref 1.5–4.5)
GLUCOSE SERPL-MCNC: 88 MG/DL (ref 65–99)
HCV AB S/CO SERPL IA: <0.1 S/CO RATIO (ref 0–0.9)
HDLC SERPL-MCNC: 53 MG/DL
LDLC SERPL CALC-MCNC: 133 MG/DL (ref 0–99)
POTASSIUM SERPL-SCNC: 4.7 MMOL/L (ref 3.5–5.2)
PROT SERPL-MCNC: 7 G/DL (ref 6–8.5)
SODIUM SERPL-SCNC: 138 MMOL/L (ref 134–144)
TRIGL SERPL-MCNC: 86 MG/DL (ref 0–149)
VLDLC SERPL CALC-MCNC: 17 MG/DL (ref 5–40)

## 2020-01-31 DIAGNOSIS — I10 ESSENTIAL HYPERTENSION: ICD-10-CM

## 2020-01-31 DIAGNOSIS — E78.5 HYPERLIPIDEMIA, UNSPECIFIED HYPERLIPIDEMIA TYPE: ICD-10-CM

## 2020-02-03 RX ORDER — PRAVASTATIN SODIUM 20 MG/1
20 TABLET ORAL
Qty: 90 TAB | Refills: 1 | Status: SHIPPED | OUTPATIENT
Start: 2020-02-03 | End: 2020-08-03

## 2020-02-03 RX ORDER — LOSARTAN POTASSIUM 50 MG/1
50 TABLET ORAL DAILY
Qty: 90 TAB | Refills: 1 | Status: SHIPPED | OUTPATIENT
Start: 2020-02-03 | End: 2020-08-03

## 2020-07-25 DIAGNOSIS — I10 ESSENTIAL HYPERTENSION: ICD-10-CM

## 2020-07-25 DIAGNOSIS — E78.5 HYPERLIPIDEMIA, UNSPECIFIED HYPERLIPIDEMIA TYPE: ICD-10-CM

## 2020-08-03 RX ORDER — LOSARTAN POTASSIUM 50 MG/1
TABLET ORAL
Qty: 90 TAB | Refills: 0 | Status: SHIPPED | OUTPATIENT
Start: 2020-08-03 | End: 2020-12-11 | Stop reason: SDUPTHER

## 2020-08-03 RX ORDER — PRAVASTATIN SODIUM 20 MG/1
TABLET ORAL
Qty: 90 TAB | Refills: 0 | Status: SHIPPED | OUTPATIENT
Start: 2020-08-03 | End: 2020-12-03

## 2020-12-02 DIAGNOSIS — E78.5 HYPERLIPIDEMIA, UNSPECIFIED HYPERLIPIDEMIA TYPE: ICD-10-CM

## 2020-12-03 RX ORDER — PRAVASTATIN SODIUM 20 MG/1
TABLET ORAL
Qty: 30 TAB | Refills: 0 | Status: SHIPPED | OUTPATIENT
Start: 2020-12-03 | End: 2020-12-11 | Stop reason: SDUPTHER

## 2020-12-11 ENCOUNTER — OFFICE VISIT (OUTPATIENT)
Dept: FAMILY MEDICINE CLINIC | Age: 65
End: 2020-12-11
Payer: MEDICARE

## 2020-12-11 VITALS
RESPIRATION RATE: 16 BRPM | HEART RATE: 73 BPM | DIASTOLIC BLOOD PRESSURE: 78 MMHG | WEIGHT: 233.2 LBS | OXYGEN SATURATION: 97 % | SYSTOLIC BLOOD PRESSURE: 143 MMHG | BODY MASS INDEX: 36.6 KG/M2 | HEIGHT: 67 IN | TEMPERATURE: 98.4 F

## 2020-12-11 DIAGNOSIS — E66.01 SEVERE OBESITY (HCC): ICD-10-CM

## 2020-12-11 DIAGNOSIS — E78.5 HYPERLIPIDEMIA, UNSPECIFIED HYPERLIPIDEMIA TYPE: ICD-10-CM

## 2020-12-11 DIAGNOSIS — I10 ESSENTIAL HYPERTENSION: ICD-10-CM

## 2020-12-11 DIAGNOSIS — Z00.00 MEDICARE ANNUAL WELLNESS VISIT, SUBSEQUENT: Primary | ICD-10-CM

## 2020-12-11 DIAGNOSIS — M25.511 ACUTE PAIN OF RIGHT SHOULDER: ICD-10-CM

## 2020-12-11 PROCEDURE — G8753 SYS BP > OR = 140: HCPCS | Performed by: NURSE PRACTITIONER

## 2020-12-11 PROCEDURE — G8754 DIAS BP LESS 90: HCPCS | Performed by: NURSE PRACTITIONER

## 2020-12-11 PROCEDURE — G0439 PPPS, SUBSEQ VISIT: HCPCS | Performed by: NURSE PRACTITIONER

## 2020-12-11 PROCEDURE — 1090F PRES/ABSN URINE INCON ASSESS: CPT | Performed by: NURSE PRACTITIONER

## 2020-12-11 PROCEDURE — G8536 NO DOC ELDER MAL SCRN: HCPCS | Performed by: NURSE PRACTITIONER

## 2020-12-11 PROCEDURE — 3017F COLORECTAL CA SCREEN DOC REV: CPT | Performed by: NURSE PRACTITIONER

## 2020-12-11 PROCEDURE — 1101F PT FALLS ASSESS-DOCD LE1/YR: CPT | Performed by: NURSE PRACTITIONER

## 2020-12-11 PROCEDURE — 99213 OFFICE O/P EST LOW 20 MIN: CPT | Performed by: NURSE PRACTITIONER

## 2020-12-11 PROCEDURE — G8419 CALC BMI OUT NRM PARAM NOF/U: HCPCS | Performed by: NURSE PRACTITIONER

## 2020-12-11 PROCEDURE — G8432 DEP SCR NOT DOC, RNG: HCPCS | Performed by: NURSE PRACTITIONER

## 2020-12-11 PROCEDURE — G8400 PT W/DXA NO RESULTS DOC: HCPCS | Performed by: NURSE PRACTITIONER

## 2020-12-11 PROCEDURE — G8427 DOCREV CUR MEDS BY ELIG CLIN: HCPCS | Performed by: NURSE PRACTITIONER

## 2020-12-11 RX ORDER — PRAVASTATIN SODIUM 20 MG/1
TABLET ORAL
Qty: 90 TAB | Refills: 1 | Status: SHIPPED | OUTPATIENT
Start: 2020-12-11 | End: 2021-01-05 | Stop reason: SDUPTHER

## 2020-12-11 RX ORDER — LOSARTAN POTASSIUM 50 MG/1
TABLET ORAL
Qty: 90 TAB | Refills: 1 | Status: SHIPPED | OUTPATIENT
Start: 2020-12-11 | End: 2021-01-05 | Stop reason: SDUPTHER

## 2020-12-11 RX ORDER — MELOXICAM 15 MG/1
15 TABLET ORAL DAILY
Qty: 30 TAB | Refills: 0 | Status: SHIPPED | OUTPATIENT
Start: 2020-12-11 | End: 2021-07-26 | Stop reason: SDUPTHER

## 2020-12-11 NOTE — PATIENT INSTRUCTIONS
Medicare Wellness Visit, Female The best way to live healthy is to have a lifestyle where you eat a well-balanced diet, exercise regularly, limit alcohol use, and quit all forms of tobacco/nicotine, if applicable. Regular preventive services are another way to keep healthy. Preventive services (vaccines, screening tests, monitoring & exams) can help personalize your care plan, which helps you manage your own care. Screening tests can find health problems at the earliest stages, when they are easiest to treat. [facility] follows the current, evidence-based guidelines published by the [facility] ([facility]) when recommending preventive services for our patients. Because we follow these guidelines, sometimes recommendations change over time as research supports it. (For example, mammograms used to be recommended annually. Even though Medicare will still pay for an annual mammogram, the newer guidelines recommend a mammogram every two years for women of average risk). Of course, you and your doctor may decide to screen more often for some diseases, based on your risk and your co-morbidities (chronic disease you are already diagnosed with). Preventive services for you include: - Medicare offers their members a free annual wellness visit, which is time for you and your primary care provider to discuss and plan for your preventive service needs. Take advantage of this benefit every year! 
-All adults over the age of 72 should receive the recommended pneumonia vaccines. Current USPSTF guidelines recommend a series of two vaccines for the best pneumonia protection.  
-All adults should have a flu vaccine yearly and a tetanus vaccine every 10 years.  
-All adults age 48 and older should receive the shingles vaccines (series of two vaccines). -All adults age 38-68 who are overweight should have a diabetes screening test once every three years. -All adults born between [birth year] and 1965 should be screened once for Hepatitis C. 
-Other screening tests and preventive services for persons with diabetes include: an eye exam to screen for diabetic retinopathy, a kidney function test, a foot exam, and stricter control over your cholesterol.  
-Cardiovascular screening for adults with routine risk involves an electrocardiogram (ECG) at intervals determined by your doctor.  
-Colorectal cancer screenings should be done for adults age 54-65 with no increased risk factors for colorectal cancer. There are a number of acceptable methods of screening for this type of cancer. Each test has its own benefits and drawbacks. Discuss with your doctor what is most appropriate for you during your annual wellness visit. The different tests include: colonoscopy (considered the best screening method), a fecal occult blood test, a fecal DNA test, and sigmoidoscopy. 
 
-A bone mass density test is recommended when a woman turns 65 to screen for osteoporosis. This test is only recommended one time, as a screening. Some providers will use this same test as a disease monitoring tool if you already have osteoporosis. -Breast cancer screenings are recommended every other year for women of normal risk, age 54-69. 
-Cervical cancer screenings for women over age 72 are only recommended with certain risk factors. Here is a list of your current Health Maintenance items (your personalized list of preventive services) with a due date: 
Health Maintenance Due Topic Date Due  
 DTaP/Tdap/Td  (1 - Tdap) 07/14/1974  Colorectal Screening  07/14/2003  Mammogram  07/14/2003  Glaucoma Screening   07/14/2018  Yearly Flu Vaccine (1) 09/01/2020  Pneumococcal Vaccine (2 of 2 - PPSV23) 09/10/2020 [address] [place] [address] Annual Well Visit  09/27/2020  Cholesterol Test   11/06/2020

## 2020-12-11 NOTE — PROGRESS NOTES
This is the Subsequent Medicare Annual Wellness Exam, performed 12 months or more after the Initial AWV or the last Subsequent AWV    I have reviewed the patient's medical history in detail and updated the computerized patient record. Here today for medicare wellness exam.  Declines mammogram, has not had in 10 years. Colonoscopy due in about a year. Declines bone density scan. Vision exam up to date. Shoulder Pain  Patient complains of right side shoulder pain. The symptoms began several weeks ago Course of symptoms since onset has been symptoms have progressed to a point and plateaued. . Pain is described as worse when leaning or reaching backwards. Symptoms were incited by reaching back on her nightstand. Patient denies N/A. Therapy to date includes none. Review of Systems   Constitutional: Negative for chills, fever and weight loss. Eyes: Negative for blurred vision. Respiratory: Negative for cough. Cardiovascular: Negative for chest pain and palpitations. Gastrointestinal: Negative for constipation, nausea and vomiting. Genitourinary: Negative for dysuria. Musculoskeletal: Positive for joint pain. Skin: Negative for rash. Neurological: Negative for dizziness and headaches. Psychiatric/Behavioral: Negative for substance abuse and suicidal ideas. The patient does not have insomnia. Depression Risk Factor Screening:     3 most recent PHQ Screens 12/11/2020   Little interest or pleasure in doing things Not at all   Feeling down, depressed, irritable, or hopeless Not at all   Total Score PHQ 2 0       Alcohol Risk Screen   Do you average more than 1 drink per night or more than 7 drinks a week:  No    On any one occasion in the past three months have you have had more than 3 drinks containing alcohol:  No        Functional Ability and Level of Safety:   Hearing: Hearing is good. Activities of Daily Living: The home contains: no safety equipment.   Patient does total self care     Ambulation: with no difficulty     Fall Risk:  Fall Risk Assessment, last 12 mths 12/11/2020   Able to walk? Yes   Fall in past 12 months? No   Fall with injury? -   Number of falls in past 12 months -   Fall Risk Score -     Abuse Screen:  Patient is not abused       Cognitive Screening   Has your family/caregiver stated any concerns about your memory: no     Cognitive Screening: Normal - Verbal Fluency Test    Physical Exam  Constitutional:       General: She is not in acute distress. Appearance: Normal appearance. She is obese. She is not diaphoretic. HENT:      Head: Normocephalic and atraumatic. Right Ear: Tympanic membrane normal.      Left Ear: Tympanic membrane normal.      Nose: Nose normal.      Mouth/Throat:      Dentition: Normal dentition. Eyes:      General:         Right eye: No discharge. Left eye: No discharge. Conjunctiva/sclera:      Right eye: Right conjunctiva is not injected. Left eye: Left conjunctiva is not injected. Pupils: Pupils are equal, round, and reactive to light. Neck:      Musculoskeletal: Neck supple. Thyroid: No thyroid mass or thyromegaly. Vascular: No carotid bruit. Cardiovascular:      Rate and Rhythm: Normal rate and regular rhythm. Pulses:           Dorsalis pedis pulses are 2+ on the right side and 2+ on the left side. Posterior tibial pulses are 2+ on the right side and 2+ on the left side. Heart sounds: Normal heart sounds, S1 normal and S2 normal. No murmur. No friction rub. No gallop. Pulmonary:      Effort: Pulmonary effort is normal. No respiratory distress. Breath sounds: Normal breath sounds. No wheezing or rales. Abdominal:      General: Bowel sounds are normal. There is no distension. Palpations: There is no mass. Tenderness: There is no abdominal tenderness. Musculoskeletal:      Right shoulder: She exhibits decreased range of motion and tenderness.  She exhibits no swelling, no crepitus, normal pulse and normal strength. Right wrist: She exhibits normal range of motion, no tenderness, no swelling and no crepitus. Comments: Mild TTP [facility]  No swelling,  or erythema noted  limited ROM with posterior movement   No pain with internal and external rotation  No weakness noted  Negative drop arm, neers test, beasley test, empty can     Lymphadenopathy:      Cervical: No cervical adenopathy. Skin:     General: Skin is warm and dry. Findings: No rash. Neurological:      Mental Status: She is alert and oriented to person, place, and time. Sensory: Sensation is intact. Gait: Gait is intact. Gait normal.   Psychiatric:         Mood and Affect: Mood and affect normal.           Assessment/Plan   Education and counseling provided:  Are appropriate based on today's review and evaluation  Screening Mammography    Diagnoses and all orders for this visit:    1. Medicare annual wellness visit, subsequent  -     URINALYSIS W/ RFLX MICROSCOPIC; Future    2. Severe obesity ([address]  - Work on diet and exercise for weight loss    3. Essential hypertension  -     losartan (COZAAR) 50 mg tablet; TAKE 1 TABLET BY MOUTH EVERY DAY  -     METABOLIC PANEL, COMPREHENSIVE; Future  -     MICROALBUMIN, UR, RAND W/ MICROALB/CREAT RATIO; Future  - Mildly elevated, refill today, labs today, monitor BP at home    4. Hyperlipidemia, unspecified hyperlipidemia type  -     pravastatin (PRAVACHOL) 20 mg tablet; TAKE 1 TABLET BY MOUTH EVERY DAY AT NIGHT  -     LIPID PANEL; Future  - Presumed stable, labs today, refills today    5. Acute pain of right shoulder  -     meloxicam (MOBIC) 15 mg tablet; Take 1 Tab by mouth daily.  - Unchanged, start meloxicam as directed, side effects discussed. Rest shoulder may do ice.   If no improvement in 7-10 days, RTC        Health Maintenance Due     Health Maintenance Due   Topic Date Due    DTaP/Tdap/Td series (1 - Tdap) 07/14/1974    Colorectal Cancer Screening Combo  07/14/2003    Breast Cancer Screen Mammogram  07/14/2003    GLAUCOMA SCREENING Q2Y  07/14/2018    Pneumococcal 65+ years (2 of 2 - PPSV23) 09/10/2020    Lipid Screen  11/06/2020       Patient Care Team   Patient Care Team:  Jeanne Lopez NP as PCP - General (Nurse Practitioner)  Jeanne Lopez NP as PCP - [facility] Empaneled Provider    History     Patient Active Problem List   Diagnosis Code    Groin pain R10.30    Hypertension I10    Back pain M54.9    Lower urinary tract infection N39.0    Severe obesity ([address] E66.01     Past Medical History:   Diagnosis Date    Cataracts, bilateral     left eye surgery 2004 and right 11/2014    Esophageal stricture     s/p dilataion 2013    Gastroesophageal reflux disease     Hyperlipemia diet-controlled    Recurrent UTI     Thyroid nodule     dx'ed 1980's      Past Surgical History:   Procedure Laterality Date    HX OTHER SURGICAL      BTL 1980    HX OTHER SURGICAL      Vitectomy left eye 2003     HX OTHER SURGICAL      Breast Augmentation   1980's      Current Outpatient Medications   Medication Sig Dispense Refill    losartan (COZAAR) 50 mg tablet TAKE 1 TABLET BY MOUTH EVERY DAY 90 Tab 1    pravastatin (PRAVACHOL) 20 mg tablet TAKE 1 TABLET BY MOUTH EVERY DAY AT NIGHT 90 Tab 1    meloxicam (MOBIC) 15 mg tablet Take 1 Tab by mouth daily. 30 Tab 0    omeprazole (PRILOSEC) 20 mg capsule Take 20 mg by mouth daily. Allergies   Allergen Reactions    Ciprofibrate Unknown (comments)       Family History   Problem Relation Age of Onset    Lung Cancer Mother     Cancer Mother     Heart Disease Father      Social History     Tobacco Use    Smoking status: Never Smoker    Smokeless tobacco: Never Used   Substance Use Topics    Alcohol use:  Yes     Alcohol/week: 4.0 standard drinks     Types: 4 Glasses of wine per week

## 2020-12-11 NOTE — PROGRESS NOTES
Patient stated name &     Chief Complaint   Patient presents with    Complete Physical     No PAP    Medication Refill     BP & Cholestrol medications        Health Maintenance Due   Topic    DTaP/Tdap/Td series (1 - Tdap)    Colorectal Cancer Screening Combo     Breast Cancer Screen Mammogram     GLAUCOMA SCREENING Q2Y     Bone Densitometry (Dexa) Screening     Flu Vaccine (1)    Pneumococcal 65+ years (2 of 2 - PPSV23)    Medicare Yearly Exam     Lipid Screen        Wt Readings from Last 3 Encounters:   20 233 lb 3.2 oz (105.8 kg)   19 206 lb (93.4 kg)   19 202 lb (91.6 kg)     Temp Readings from Last 3 Encounters:   20 98.4 °F (36.9 °C) (Oral)   19 98.1 °F (36.7 °C) (Oral)   19 98.5 °F (36.9 °C) (Oral)     BP Readings from Last 3 Encounters:   20 (!) 144/83   19 137/81   19 123/85     Pulse Readings from Last 3 Encounters:   20 73   19 71   19 60         Learning Assessment:  :     Learning Assessment 10/10/2018   PRIMARY LEARNER Patient   HIGHEST LEVEL OF EDUCATION - PRIMARY LEARNER  > 4 YEARS OF COLLEGE   PRIMARY LANGUAGE ENGLISH   LEARNER PREFERENCE PRIMARY READING   ANSWERED BY self   RELATIONSHIP SELF       Depression Screening:  :     3 most recent PHQ Screens 2020   Little interest or pleasure in doing things Not at all   Feeling down, depressed, irritable, or hopeless Not at all   Total Score PHQ 2 0       Fall Risk Assessment:  :     Fall Risk Assessment, last 12 mths 2020   Able to walk? Yes   Fall in past 12 months? No   Fall with injury? -   Number of falls in past 12 months -   Fall Risk Score -       Abuse Screening:  :     No flowsheet data found. Coordination of Care Questionnaire:  :     1) Have you been to an emergency room, urgent care clinic since your last visit? No    Hospitalized since your last visit?  No             2) Have you seen or consulted any other health care providers outside of [facility] since your last visit? No  (Include any pap smears or colon screenings in this section.)    Patient is accompanied by self I have received verbal consent from Pascual Pastor to discuss any/all medical information while they are present in the room.

## 2021-01-05 DIAGNOSIS — E78.5 HYPERLIPIDEMIA, UNSPECIFIED HYPERLIPIDEMIA TYPE: ICD-10-CM

## 2021-01-05 DIAGNOSIS — K21.9 GASTROESOPHAGEAL REFLUX DISEASE, UNSPECIFIED WHETHER ESOPHAGITIS PRESENT: Primary | ICD-10-CM

## 2021-01-05 DIAGNOSIS — I10 ESSENTIAL HYPERTENSION: ICD-10-CM

## 2021-01-05 RX ORDER — PRAVASTATIN SODIUM 20 MG/1
TABLET ORAL
Qty: 90 TAB | Refills: 1 | Status: SHIPPED | OUTPATIENT
Start: 2021-01-05 | End: 2021-07-26 | Stop reason: SDUPTHER

## 2021-01-05 RX ORDER — OMEPRAZOLE 20 MG/1
20 CAPSULE, DELAYED RELEASE ORAL
Qty: 90 CAP | Refills: 1 | Status: SHIPPED | OUTPATIENT
Start: 2021-01-05 | End: 2021-07-15

## 2021-01-05 RX ORDER — LOSARTAN POTASSIUM 50 MG/1
TABLET ORAL
Qty: 90 TAB | Refills: 1 | Status: SHIPPED | OUTPATIENT
Start: 2021-01-05 | End: 2021-02-22 | Stop reason: SDUPTHER

## 2021-02-22 DIAGNOSIS — I10 ESSENTIAL HYPERTENSION: ICD-10-CM

## 2021-02-22 RX ORDER — LOSARTAN POTASSIUM 50 MG/1
TABLET ORAL
Qty: 90 TAB | Refills: 1 | Status: SHIPPED | OUTPATIENT
Start: 2021-02-22 | End: 2021-07-26 | Stop reason: SDUPTHER

## 2021-03-31 ENCOUNTER — PATIENT MESSAGE (OUTPATIENT)
Dept: FAMILY MEDICINE CLINIC | Age: 66
End: 2021-03-31

## 2021-07-14 DIAGNOSIS — K21.9 GASTROESOPHAGEAL REFLUX DISEASE, UNSPECIFIED WHETHER ESOPHAGITIS PRESENT: ICD-10-CM

## 2021-07-15 RX ORDER — OMEPRAZOLE 20 MG/1
CAPSULE, DELAYED RELEASE ORAL
Qty: 90 CAPSULE | Refills: 3 | Status: SHIPPED | OUTPATIENT
Start: 2021-07-15 | End: 2021-12-06 | Stop reason: SDUPTHER

## 2021-07-26 ENCOUNTER — OFFICE VISIT (OUTPATIENT)
Dept: FAMILY MEDICINE CLINIC | Age: 66
End: 2021-07-26
Payer: MEDICARE

## 2021-07-26 VITALS
HEIGHT: 67 IN | RESPIRATION RATE: 16 BRPM | SYSTOLIC BLOOD PRESSURE: 158 MMHG | WEIGHT: 240.2 LBS | OXYGEN SATURATION: 93 % | BODY MASS INDEX: 37.7 KG/M2 | TEMPERATURE: 98.2 F | DIASTOLIC BLOOD PRESSURE: 84 MMHG | HEART RATE: 78 BPM

## 2021-07-26 DIAGNOSIS — I10 ESSENTIAL HYPERTENSION: ICD-10-CM

## 2021-07-26 DIAGNOSIS — R22.42 LOCALIZED SWELLING OF LEFT LOWER LEG: Primary | ICD-10-CM

## 2021-07-26 DIAGNOSIS — M25.511 ACUTE PAIN OF RIGHT SHOULDER: ICD-10-CM

## 2021-07-26 DIAGNOSIS — E78.5 HYPERLIPIDEMIA, UNSPECIFIED HYPERLIPIDEMIA TYPE: ICD-10-CM

## 2021-07-26 PROCEDURE — 1101F PT FALLS ASSESS-DOCD LE1/YR: CPT | Performed by: FAMILY MEDICINE

## 2021-07-26 PROCEDURE — G8754 DIAS BP LESS 90: HCPCS | Performed by: FAMILY MEDICINE

## 2021-07-26 PROCEDURE — 1090F PRES/ABSN URINE INCON ASSESS: CPT | Performed by: FAMILY MEDICINE

## 2021-07-26 PROCEDURE — G8753 SYS BP > OR = 140: HCPCS | Performed by: FAMILY MEDICINE

## 2021-07-26 PROCEDURE — G8510 SCR DEP NEG, NO PLAN REQD: HCPCS | Performed by: FAMILY MEDICINE

## 2021-07-26 PROCEDURE — G8400 PT W/DXA NO RESULTS DOC: HCPCS | Performed by: FAMILY MEDICINE

## 2021-07-26 PROCEDURE — 99213 OFFICE O/P EST LOW 20 MIN: CPT | Performed by: FAMILY MEDICINE

## 2021-07-26 PROCEDURE — G8536 NO DOC ELDER MAL SCRN: HCPCS | Performed by: FAMILY MEDICINE

## 2021-07-26 PROCEDURE — G8427 DOCREV CUR MEDS BY ELIG CLIN: HCPCS | Performed by: FAMILY MEDICINE

## 2021-07-26 PROCEDURE — 3017F COLORECTAL CA SCREEN DOC REV: CPT | Performed by: FAMILY MEDICINE

## 2021-07-26 PROCEDURE — G8417 CALC BMI ABV UP PARAM F/U: HCPCS | Performed by: FAMILY MEDICINE

## 2021-07-26 RX ORDER — MELOXICAM 15 MG/1
15 TABLET ORAL DAILY
Qty: 30 TABLET | Refills: 0 | Status: SHIPPED | OUTPATIENT
Start: 2021-07-26 | End: 2022-01-14 | Stop reason: ALTCHOICE

## 2021-07-26 RX ORDER — PRAVASTATIN SODIUM 20 MG/1
TABLET ORAL
Qty: 90 TABLET | Refills: 1 | Status: SHIPPED | OUTPATIENT
Start: 2021-07-26 | End: 2021-12-06 | Stop reason: SDUPTHER

## 2021-07-26 RX ORDER — LOSARTAN POTASSIUM 50 MG/1
TABLET ORAL
Qty: 90 TABLET | Refills: 1 | Status: SHIPPED | OUTPATIENT
Start: 2021-07-26 | End: 2021-12-06 | Stop reason: SDUPTHER

## 2021-07-26 NOTE — PATIENT INSTRUCTIONS
2 extra strength twice a day for two weeks of  1-2 Advil or every 4-6 hours as needed. I do not believe this is a DVT with foot and foot swelling decreasing. There is no calf swelling. If there is any shortness of breath or chest pain, please go to ER for CT of lungs and doppler studies of legs. Pt has none of this presently. Her increased activity over the last 2 weeks has caused a mild arthritis flare that is presently improving. If swelling recurs, we will order doppler study.

## 2021-07-26 NOTE — PROGRESS NOTES
[address] [facility], [address] [place] [address]  Dr. Dimitry Krause. Ricarda Camargo  Phone:  [phone]  Fax:  [phone]    Progress Note    Name:  Cecile Mercedes  Age:  74 y.o.  :  1955  Encounter Date:  2021    Primary Care Provider:  Enrique Walker NP    Chief Complaint   Patient presents with    Knee Pain     For x1 mos; left knee pain, swollen     Foot Swelling     For x5 days; Left foot swollen      HPI:  Patient here for left knee pain after camping trip. Past Medical History:   Diagnosis Date    Cataracts, bilateral     left eye surgery  and right 2014    Esophageal stricture     s/p dilataion     Gastroesophageal reflux disease     Hyperlipemia diet-controlled    Recurrent UTI     Thyroid nodule     dx'ed      Past Surgical History:   Procedure Laterality Date    HX OTHER SURGICAL      BTL     HX OTHER SURGICAL      Vitectomy left eye      HX OTHER SURGICAL      Breast Augmentation         Family History   Problem Relation Age of Onset    Lung Cancer Mother     Cancer Mother     Heart Disease Father      Social History     Socioeconomic History    Marital status:      Spouse name: Not on file    Number of children: Not on file    Years of education: Not on file    Highest education level: Not on file   Tobacco Use    Smoking status: Never Smoker    Smokeless tobacco: Never Used   Vaping Use    Vaping Use: Never used   Substance and Sexual Activity    Alcohol use: Yes     Alcohol/week: 4.0 standard drinks     Types: 4 Glasses of wine per week    Drug use: No    Sexual activity: Not Currently     Social Determinants of Health     Financial Resource Strain:     Difficulty of Paying Living Expenses:    Food Insecurity:     Worried About Running Out of Food in the Last Year:     Ran Out of Food in the Last Year:    Transportation Needs:     Lack of Transportation (Medical):      Lack of Transportation (Non-Medical):    Physical Activity:     Days of Exercise per Week:     Minutes of Exercise per Session:    Stress:     Feeling of Stress :    Social Connections:     Frequency of Communication with Friends and Family:     Frequency of Social Gatherings with Friends and Family:     Attends Protestant Services:     Active Member of Clubs or Organizations:     Attends Club or Organization Meetings:     Marital Status:        Current Outpatient Medications   Medication Sig Dispense Refill    losartan (COZAAR) 50 mg tablet TAKE 1 TABLET BY MOUTH EVERY DAY 90 Tablet 1    pravastatin (PRAVACHOL) 20 mg tablet TAKE 1 TABLET BY MOUTH EVERY DAY AT NIGHT 90 Tablet 1    meloxicam (MOBIC) 15 mg tablet Take 1 Tablet by mouth daily. 30 Tablet 0    omeprazole (PRILOSEC) 20 mg capsule TAKE 1 CAPSULE DAILY AS NEEDED FOR ACID REFLUX 90 Capsule 3     Allergies   Allergen Reactions    Ciprofibrate Unknown (comments)     Patient Active Problem List   Diagnosis Code    Groin pain R10.30    Hypertension I10    Back pain M54.9    Lower urinary tract infection N39.0    Severe obesity ([address] E66.01       Review of Systems   Constitutional: Negative for fever. Respiratory: Negative for shortness of breath. Cardiovascular: Negative for chest pain, orthopnea and PND. Gastrointestinal: Negative for abdominal pain, nausea and vomiting. Visit Vitals  BP (!) 158/84 (BP 1 Location: Left upper arm, BP Patient Position: Sitting, BP Cuff Size: Adult long)   Pulse 78   Temp 98.2 °F (36.8 °C) (Temporal)   Resp 16   Ht 5' 7\" (1.702 m)   Wt 240 lb 3.2 oz (109 kg)   LMP  (LMP Unknown)   SpO2 93%   BMI 37.62 kg/m²     Physical Exam  Cardiovascular:      Rate and Rhythm: Normal rate and regular rhythm. Heart sounds: Normal heart sounds. Pulmonary:      Effort: Pulmonary effort is normal. No respiratory distress. Breath sounds: Normal breath sounds. No wheezing. Skin:     General: Skin is warm and dry. Neurological:      Mental Status: She is alert and oriented to person, place, and time. Left knee, left calf and left foot are same as right leg and calf, foot and knee. 25 cm's above left and right lateral malleoli the circumference is 50.5 cm's on right and 51 cm's on left. Pt has an order from [facility] for a doppler study and she will decide if she wants to get it. I have told her that I cannot be sure that this is not a DVT but I doubt it. I think this is all arthritis. Labs/Radiology Reviewed    Assessment/Plan:    ICD-10-CM ICD-9-CM    1. Localized swelling of left lower leg  R22.42 782.2 DUPLEX LOWER EXT VENOUS BILAT   2. Essential hypertension  I10 401.9 losartan (COZAAR) 50 mg tablet   3. Hyperlipidemia, unspecified hyperlipidemia type  E78.5 272.4 pravastatin (PRAVACHOL) 20 mg tablet   4. Acute pain of right shoulder  M25.511 719.41 meloxicam (MOBIC) 15 mg tablet       Orders Placed This Encounter    losartan (COZAAR) 50 mg tablet    pravastatin (PRAVACHOL) 20 mg tablet    meloxicam (MOBIC) 15 mg tablet     2 extra strength twice a day for two weeks of 1-2 Advil or every 4-6 hours as needed. I do not believe this is a DVT with foot and foot swelling decreasing. There is no calf swelling. Follow-up and Dispositions    · Return in about 1 day (around 7/27/2021), or if symptoms worsen or fail to improve. Follow-up and Disposition History        If there is any shortness of breath or chest pain, please go to ER for CT of lungs and doppler studies of legs. Pt has none of this presently. Her increased activity over the last 2 weeks has caused a mild arthritis flare that is presently improving. If swelling recurs, we will order doppler study.        Nikki Marie MD  7/26/2021

## 2021-07-26 NOTE — PROGRESS NOTES
Identified pt with two pt identifiers(name and ). Reviewed record in preparation for visit and have obtained necessary documentation. Chief Complaint   Patient presents with    Knee Pain     For x1 mos; left knee pain, swollen     Foot Swelling     For x5 days; Left foot swollen         Health Maintenance Due   Topic    COVID-19 Vaccine (1)    DTaP/Tdap/Td series (1 - Tdap)    Colorectal Cancer Screening Combo     Breast Cancer Screen Mammogram     Pneumococcal 65+ years (2 of 2 - PPSV23)       Coordination of Care Questionnaire:  :   1) Have you been to an emergency room, urgent care, or hospitalized since your last visit? If yes, where when, and reason for visit? Yes, [facility] for swollen left knee/ foot      2. Have seen or consulted any other health care provider since your last visit? If yes, where when, and reason for visit? Yes, [facility]- check up        Patient is accompanied by self I have received verbal consent from Trina Cunha to discuss any/all medical information while they are present in the room.

## 2021-12-06 DIAGNOSIS — I10 ESSENTIAL HYPERTENSION: ICD-10-CM

## 2021-12-06 DIAGNOSIS — K21.9 GASTROESOPHAGEAL REFLUX DISEASE, UNSPECIFIED WHETHER ESOPHAGITIS PRESENT: ICD-10-CM

## 2021-12-06 DIAGNOSIS — E78.5 HYPERLIPIDEMIA, UNSPECIFIED HYPERLIPIDEMIA TYPE: ICD-10-CM

## 2021-12-06 RX ORDER — OMEPRAZOLE 20 MG/1
CAPSULE, DELAYED RELEASE ORAL
Qty: 30 CAPSULE | Refills: 0 | Status: SHIPPED | OUTPATIENT
Start: 2021-12-06 | End: 2022-01-14 | Stop reason: SDUPTHER

## 2021-12-06 RX ORDER — LOSARTAN POTASSIUM 50 MG/1
TABLET ORAL
Qty: 30 TABLET | Refills: 0 | Status: SHIPPED | OUTPATIENT
Start: 2021-12-06 | End: 2022-01-14 | Stop reason: SDUPTHER

## 2021-12-06 RX ORDER — PRAVASTATIN SODIUM 20 MG/1
TABLET ORAL
Qty: 30 TABLET | Refills: 0 | Status: SHIPPED | OUTPATIENT
Start: 2021-12-06 | End: 2022-01-14 | Stop reason: SDUPTHER

## 2022-01-14 ENCOUNTER — VIRTUAL VISIT (OUTPATIENT)
Dept: FAMILY MEDICINE CLINIC | Age: 67
End: 2022-01-14
Payer: MEDICARE

## 2022-01-14 DIAGNOSIS — E66.01 SEVERE OBESITY (BMI 35.0-35.9 WITH COMORBIDITY) (HCC): ICD-10-CM

## 2022-01-14 DIAGNOSIS — I10 ESSENTIAL HYPERTENSION: ICD-10-CM

## 2022-01-14 DIAGNOSIS — Z12.31 ENCOUNTER FOR SCREENING MAMMOGRAM FOR MALIGNANT NEOPLASM OF BREAST: ICD-10-CM

## 2022-01-14 DIAGNOSIS — E78.5 HYPERLIPIDEMIA, UNSPECIFIED HYPERLIPIDEMIA TYPE: ICD-10-CM

## 2022-01-14 DIAGNOSIS — Z00.00 MEDICARE ANNUAL WELLNESS VISIT, SUBSEQUENT: Primary | ICD-10-CM

## 2022-01-14 DIAGNOSIS — Z78.0 POSTMENOPAUSAL STATE: ICD-10-CM

## 2022-01-14 DIAGNOSIS — K21.9 GASTROESOPHAGEAL REFLUX DISEASE, UNSPECIFIED WHETHER ESOPHAGITIS PRESENT: ICD-10-CM

## 2022-01-14 PROCEDURE — G8756 NO BP MEASURE DOC: HCPCS | Performed by: NURSE PRACTITIONER

## 2022-01-14 PROCEDURE — 1090F PRES/ABSN URINE INCON ASSESS: CPT | Performed by: NURSE PRACTITIONER

## 2022-01-14 PROCEDURE — 1101F PT FALLS ASSESS-DOCD LE1/YR: CPT | Performed by: NURSE PRACTITIONER

## 2022-01-14 PROCEDURE — G8400 PT W/DXA NO RESULTS DOC: HCPCS | Performed by: NURSE PRACTITIONER

## 2022-01-14 PROCEDURE — G8432 DEP SCR NOT DOC, RNG: HCPCS | Performed by: NURSE PRACTITIONER

## 2022-01-14 PROCEDURE — 99213 OFFICE O/P EST LOW 20 MIN: CPT | Performed by: NURSE PRACTITIONER

## 2022-01-14 PROCEDURE — G0439 PPPS, SUBSEQ VISIT: HCPCS | Performed by: NURSE PRACTITIONER

## 2022-01-14 PROCEDURE — G9899 SCRN MAM PERF RSLTS DOC: HCPCS | Performed by: NURSE PRACTITIONER

## 2022-01-14 PROCEDURE — G8417 CALC BMI ABV UP PARAM F/U: HCPCS | Performed by: NURSE PRACTITIONER

## 2022-01-14 PROCEDURE — 3017F COLORECTAL CA SCREEN DOC REV: CPT | Performed by: NURSE PRACTITIONER

## 2022-01-14 PROCEDURE — G8427 DOCREV CUR MEDS BY ELIG CLIN: HCPCS | Performed by: NURSE PRACTITIONER

## 2022-01-14 PROCEDURE — G8536 NO DOC ELDER MAL SCRN: HCPCS | Performed by: NURSE PRACTITIONER

## 2022-01-14 RX ORDER — LOSARTAN POTASSIUM 50 MG/1
TABLET ORAL
Qty: 90 TABLET | Refills: 1 | Status: SHIPPED | OUTPATIENT
Start: 2022-01-14 | End: 2022-06-15

## 2022-01-14 RX ORDER — PRAVASTATIN SODIUM 20 MG/1
TABLET ORAL
Qty: 90 TABLET | Refills: 1 | Status: SHIPPED | OUTPATIENT
Start: 2022-01-14 | End: 2022-06-15

## 2022-01-14 RX ORDER — OMEPRAZOLE 20 MG/1
CAPSULE, DELAYED RELEASE ORAL
Qty: 90 CAPSULE | Refills: 1 | Status: SHIPPED | OUTPATIENT
Start: 2022-01-14 | End: 2022-06-15

## 2022-01-14 NOTE — PROGRESS NOTES
This is the Subsequent Medicare Annual Wellness Exam, performed 12 months or more after the Initial AWV or the last Subsequent AWV    I have reviewed the patient's medical history in detail and updated the computerized patient record. Here today for Medicare wellness exam  Due for colonoscopy and she is calling the office to get scheduled  We will do a mammogram and has not had 1 in many many years, also agreed to do bone density scan will order    VV today for chronic conditions    HTN  Checks BP at home and got 138/79. Takes medications as prescribed with no reported side effects. Denies CP, SOB, palpitations or leg swelling. Review of Systems   Constitutional: Negative for chills, fever and malaise/fatigue. Eyes: Negative for blurred vision. Respiratory: Negative for cough and shortness of breath. Cardiovascular: Negative for chest pain, palpitations and leg swelling. Neurological: Negative for dizziness and headaches. Assessment/Plan   Education and counseling provided:  Are appropriate based on today's review and evaluation    1. Medicare annual wellness visit, subsequent   -stable, preventative screenings discussed, and ordered  2. Hyperlipidemia, unspecified hyperlipidemia type  -     pravastatin (PRAVACHOL) 20 mg tablet; TAKE 1 TABLET BY MOUTH EVERY DAY AT NIGHT, Normal, Disp-90 Tablet, R-1  -     LIPID PANEL; Future  -     TSH 3RD GENERATION; Future  - presumed stable labs today refill today, plan for follow up in 6 months, sooner if needed  3. Severe obesity (BMI 35.0-35.9 with comorbidity) ([facility] [address]   -Work on diet and exercise for weight loss. Advised to cut down on carbohydrate intake and increase activity  4. Gastroesophageal reflux disease, unspecified whether esophagitis present  -     omeprazole (PRILOSEC) 20 mg capsule; TAKE 1 CAPSULE DAILY AS NEEDED FOR ACID REFLUX, Normal, Disp-90 Capsule, R-1  - Stable, continue current therapy, refill today  5.  Essential hypertension  - losartan (COZAAR) 50 mg tablet; TAKE 1 TABLET BY MOUTH EVERY DAY, Normal, Disp-90 Tablet, R-1  -     METABOLIC PANEL, COMPREHENSIVE; Future  -     MICROALBUMIN, UR, RAND W/ MICROALB/CREAT RATIO; Future  - Presumed stable, labs today, refill today. Advised to check blood pressure at home  6. Encounter for screening mammogram for malignant neoplasm of breast  -     KARINA MAMMO BI SCREENING INCL CAD; Future  7. Postmenopausal state  -     DEXA BONE DENSITY STUDY AXIAL; Future       Depression Risk Factor Screening:     3 most recent PHQ Screens 7/26/2021   Little interest or pleasure in doing things Not at all   Feeling down, depressed, irritable, or hopeless Not at all   Total Score PHQ 2 0       Alcohol & Drug Abuse Risk Screen    Do you average more than 1 drink per night or more than 7 drinks a week:  No    On any one occasion in the past three months have you have had more than 3 drinks containing alcohol:  No          Functional Ability and Level of Safety:    Hearing: Hearing is good. Activities of Daily Living: The home contains: no safety equipment. Patient does total self care      Ambulation: with no difficulty     Fall Risk:  Fall Risk Assessment, last 12 mths 12/11/2020   Able to walk? Yes   Fall in past 12 months? No   Number of falls in past 12 months -   Fall with injury?  -      Abuse Screen:  Patient is not abused       Cognitive Screening    Has your family/caregiver stated any concerns about your memory: no    Cognitive Screening: Normal - Verbal Fluency Test    Health Maintenance Due     Health Maintenance Due   Topic Date Due    DTaP/Tdap/Td series (1 - Tdap) Never done    Colorectal Cancer Screening Combo  Never done    Breast Cancer Screen Mammogram  Never done    Bone Densitometry (Dexa) Screening  Never done    Flu Vaccine (1) 09/01/2021    COVID-19 Vaccine (3 - Booster for [employer] series) 10/22/2021    Lipid Screen  12/15/2021       Patient Care Team   Patient Care Team:  Kiesha Valentino NP as PCP - General (Nurse Practitioner)  Kiesha Valentino NP as PCP - [facility] Empaneled Provider    History     Patient Active Problem List   Diagnosis Code    Groin pain R10.30    Hypertension I10    Back pain M54.9    Lower urinary tract infection N39.0    Severe obesity ([address] E66.01     Past Medical History:   Diagnosis Date    Cataracts, bilateral     left eye surgery 2004 and right 11/2014    Esophageal stricture     s/p dilataion 2013    Gastroesophageal reflux disease     Hyperlipemia diet-controlled    Recurrent UTI     Thyroid nodule     dx'ed 1980's      Past Surgical History:   Procedure Laterality Date    HX OTHER SURGICAL      BTL 1980    HX OTHER SURGICAL      Vitectomy left eye 2003     HX OTHER SURGICAL      Breast Augmentation   1980's      Current Outpatient Medications   Medication Sig Dispense Refill    pravastatin (PRAVACHOL) 20 mg tablet TAKE 1 TABLET BY MOUTH EVERY DAY AT NIGHT 90 Tablet 1    omeprazole (PRILOSEC) 20 mg capsule TAKE 1 CAPSULE DAILY AS NEEDED FOR ACID REFLUX 90 Capsule 1    losartan (COZAAR) 50 mg tablet TAKE 1 TABLET BY MOUTH EVERY DAY 90 Tablet 1     Allergies   Allergen Reactions    Ciprofibrate Unknown (comments)       Family History   Problem Relation Age of Onset    Lung Cancer Mother     Cancer Mother     Heart Disease Father      Social History     Tobacco Use    Smoking status: Never Smoker    Smokeless tobacco: Never Used   Substance Use Topics    Alcohol use: Yes     Alcohol/week: 4.0 standard drinks     Types: 4 Glasses of wine per week       Chiquita Moss was evaluated through a synchronous (real-time) audio-video encounter. The patient (or guardian if applicable) is aware that this is a billable service. Verbal consent to proceed has been obtained within the past 12 months.  The visit was conducted pursuant to the emergency declaration under the [address] waiver authority and the [employer] and [employer] General Act. Patient identification was verified, and a caregiver was present when appropriate. The patient was located in a state where the provider was credentialed to provide care.        Brook Earl NP

## 2022-01-14 NOTE — PATIENT INSTRUCTIONS
Medicare Wellness Visit, Female     The best way to live healthy is to have a lifestyle where you eat a well-balanced diet, exercise regularly, limit alcohol use, and quit all forms of tobacco/nicotine, if applicable. Regular preventive services are another way to keep healthy. Preventive services (vaccines, screening tests, monitoring & exams) can help personalize your care plan, which helps you manage your own care. Screening tests can find health problems at the earliest stages, when they are easiest to treat. [facility] follows the current, evidence-based guidelines published by the [facility] Lomax ([facility]) when recommending preventive services for our patients. Because we follow these guidelines, sometimes recommendations change over time as research supports it. (For example, mammograms used to be recommended annually. Even though Medicare will still pay for an annual mammogram, the newer guidelines recommend a mammogram every two years for women of average risk). Of course, you and your doctor may decide to screen more often for some diseases, based on your risk and your co-morbidities (chronic disease you are already diagnosed with). Preventive services for you include:  - Medicare offers their members a free annual wellness visit, which is time for you and your primary care provider to discuss and plan for your preventive service needs. Take advantage of this benefit every year!  -All adults over the age of 72 should receive the recommended pneumonia vaccines. Current USPSTF guidelines recommend a series of two vaccines for the best pneumonia protection.   -All adults should have a flu vaccine yearly and a tetanus vaccine every 10 years.   -All adults age 48 and older should receive the shingles vaccines (series of two vaccines).       -All adults age 38-68 who are overweight should have a diabetes screening test once every three years.   -All adults born between [birth year] and 1965 should be screened once for Hepatitis C.  -Other screening tests and preventive services for persons with diabetes include: an eye exam to screen for diabetic retinopathy, a kidney function test, a foot exam, and stricter control over your cholesterol.   -Cardiovascular screening for adults with routine risk involves an electrocardiogram (ECG) at intervals determined by your doctor.   -Colorectal cancer screenings should be done for adults age 54-65 with no increased risk factors for colorectal cancer. There are a number of acceptable methods of screening for this type of cancer. Each test has its own benefits and drawbacks. Discuss with your doctor what is most appropriate for you during your annual wellness visit. The different tests include: colonoscopy (considered the best screening method), a fecal occult blood test, a fecal DNA test, and sigmoidoscopy.    -A bone mass density test is recommended when a woman turns 65 to screen for osteoporosis. This test is only recommended one time, as a screening. Some providers will use this same test as a disease monitoring tool if you already have osteoporosis. -Breast cancer screenings are recommended every other year for women of normal risk, age 54-69.  -Cervical cancer screenings for women over age 72 are only recommended with certain risk factors.      Here is a list of your current Health Maintenance items (your personalized list of preventive services) with a due date:  Health Maintenance Due   Topic Date Due    DTaP/Tdap/Td  (1 - Tdap) Never done    Colorectal Screening  Never done    Mammogram  Never done    Bone Mineral Density   Never done    Yearly Flu Vaccine (1) 09/01/2021    COVID-19 Vaccine (3 - Booster for Pfizer series) 10/22/2021    Cholesterol Test   12/15/2021

## 2022-01-18 LAB
ALBUMIN SERPL-MCNC: 4.1 G/DL (ref 3.5–5)
ALBUMIN/GLOB SERPL: 1.3 {RATIO} (ref 1.1–2.2)
ALP SERPL-CCNC: 123 U/L (ref 45–117)
ALT SERPL-CCNC: 29 U/L (ref 12–78)
ANION GAP SERPL CALC-SCNC: 3 MMOL/L (ref 5–15)
AST SERPL-CCNC: 14 U/L (ref 15–37)
BILIRUB SERPL-MCNC: 0.4 MG/DL (ref 0.2–1)
BUN SERPL-MCNC: 14 MG/DL (ref 6–20)
BUN/CREAT SERPL: 19 (ref 12–20)
CALCIUM SERPL-MCNC: 9.3 MG/DL (ref 8.5–10.1)
CHLORIDE SERPL-SCNC: 107 MMOL/L (ref 97–108)
CHOLEST SERPL-MCNC: 228 MG/DL
CO2 SERPL-SCNC: 28 MMOL/L (ref 21–32)
CREAT SERPL-MCNC: 0.73 MG/DL (ref 0.55–1.02)
CREAT UR-MCNC: 28.8 MG/DL
GLOBULIN SER CALC-MCNC: 3.1 G/DL (ref 2–4)
GLUCOSE SERPL-MCNC: 92 MG/DL (ref 65–100)
HDLC SERPL-MCNC: 37 MG/DL
HDLC SERPL: 6.2 {RATIO} (ref 0–5)
LDLC SERPL CALC-MCNC: 143.8 MG/DL (ref 0–100)
MICROALBUMIN UR-MCNC: 0.78 MG/DL
MICROALBUMIN/CREAT UR-RTO: 27 MG/G (ref 0–30)
POTASSIUM SERPL-SCNC: 4.6 MMOL/L (ref 3.5–5.1)
PROT SERPL-MCNC: 7.2 G/DL (ref 6.4–8.2)
SODIUM SERPL-SCNC: 138 MMOL/L (ref 136–145)
TRIGL SERPL-MCNC: 236 MG/DL (ref ?–150)
TSH SERPL DL<=0.05 MIU/L-ACNC: 1.12 UIU/ML (ref 0.36–3.74)
VLDLC SERPL CALC-MCNC: 47.2 MG/DL

## 2022-03-01 LAB — COLONOSCOPY, EXTERNAL: NORMAL

## 2022-03-19 PROBLEM — M54.9 BACK PAIN: Status: ACTIVE | Noted: 2018-10-09

## 2022-03-19 PROBLEM — R10.30 GROIN PAIN: Status: ACTIVE | Noted: 2018-10-09

## 2022-03-19 PROBLEM — E66.01 SEVERE OBESITY (HCC): Status: ACTIVE | Noted: 2020-12-11

## 2022-03-19 PROBLEM — N39.0 LOWER URINARY TRACT INFECTION: Status: ACTIVE | Noted: 2018-10-09

## 2022-03-19 PROBLEM — I10 HYPERTENSION: Status: ACTIVE | Noted: 2018-10-09

## 2022-03-30 ENCOUNTER — HOSPITAL ENCOUNTER (OUTPATIENT)
Dept: MAMMOGRAPHY | Age: 67
Discharge: HOME | End: 2022-03-30
Attending: NURSE PRACTITIONER
Payer: MEDICARE

## 2022-03-30 DIAGNOSIS — Z12.31 ENCOUNTER FOR SCREENING MAMMOGRAM FOR MALIGNANT NEOPLASM OF BREAST: ICD-10-CM

## 2022-03-30 DIAGNOSIS — Z78.0 POSTMENOPAUSAL STATE: ICD-10-CM

## 2022-03-30 PROCEDURE — 77080 DXA BONE DENSITY AXIAL: CPT

## 2022-03-30 PROCEDURE — 77067 SCR MAMMO BI INCL CAD: CPT

## 2022-06-10 DIAGNOSIS — I10 ESSENTIAL HYPERTENSION: ICD-10-CM

## 2022-06-10 DIAGNOSIS — K21.9 GASTROESOPHAGEAL REFLUX DISEASE, UNSPECIFIED WHETHER ESOPHAGITIS PRESENT: ICD-10-CM

## 2022-06-10 DIAGNOSIS — E78.5 HYPERLIPIDEMIA, UNSPECIFIED HYPERLIPIDEMIA TYPE: ICD-10-CM

## 2022-06-15 RX ORDER — OMEPRAZOLE 20 MG/1
CAPSULE, DELAYED RELEASE ORAL
Qty: 30 CAPSULE | Refills: 0 | Status: SHIPPED | OUTPATIENT
Start: 2022-06-15 | End: 2022-07-18

## 2022-06-15 RX ORDER — LOSARTAN POTASSIUM 50 MG/1
TABLET ORAL
Qty: 30 TABLET | Refills: 0 | Status: SHIPPED | OUTPATIENT
Start: 2022-06-15 | End: 2022-07-15

## 2022-06-15 RX ORDER — PRAVASTATIN SODIUM 20 MG/1
TABLET ORAL
Qty: 90 TABLET | Refills: 1 | Status: SHIPPED | OUTPATIENT
Start: 2022-06-15 | End: 2022-07-15

## 2022-07-15 DIAGNOSIS — K21.9 GASTROESOPHAGEAL REFLUX DISEASE, UNSPECIFIED WHETHER ESOPHAGITIS PRESENT: ICD-10-CM

## 2022-07-18 RX ORDER — OMEPRAZOLE 20 MG/1
CAPSULE, DELAYED RELEASE ORAL
Qty: 30 CAPSULE | Refills: 0 | Status: SHIPPED | OUTPATIENT
Start: 2022-07-18 | End: 2022-08-15

## 2022-08-14 DIAGNOSIS — K21.9 GASTROESOPHAGEAL REFLUX DISEASE, UNSPECIFIED WHETHER ESOPHAGITIS PRESENT: ICD-10-CM

## 2022-08-15 RX ORDER — OMEPRAZOLE 20 MG/1
CAPSULE, DELAYED RELEASE ORAL
Qty: 30 CAPSULE | Refills: 0 | Status: SHIPPED | OUTPATIENT
Start: 2022-08-15 | End: 2022-09-08

## 2022-09-08 DIAGNOSIS — K21.9 GASTROESOPHAGEAL REFLUX DISEASE, UNSPECIFIED WHETHER ESOPHAGITIS PRESENT: ICD-10-CM

## 2022-09-08 RX ORDER — OMEPRAZOLE 20 MG/1
CAPSULE, DELAYED RELEASE ORAL
Qty: 30 CAPSULE | Refills: 0 | Status: SHIPPED | OUTPATIENT
Start: 2022-09-08 | End: 2022-10-21

## 2022-10-21 DIAGNOSIS — K21.9 GASTROESOPHAGEAL REFLUX DISEASE, UNSPECIFIED WHETHER ESOPHAGITIS PRESENT: ICD-10-CM

## 2022-10-21 RX ORDER — OMEPRAZOLE 20 MG/1
CAPSULE, DELAYED RELEASE ORAL
Qty: 30 CAPSULE | Refills: 0 | Status: SHIPPED | OUTPATIENT
Start: 2022-10-21

## 2023-01-18 ENCOUNTER — OFFICE VISIT (OUTPATIENT)
Dept: FAMILY MEDICINE CLINIC | Age: 68
End: 2023-01-18
Payer: MEDICARE

## 2023-01-18 VITALS
HEIGHT: 67 IN | DIASTOLIC BLOOD PRESSURE: 84 MMHG | HEART RATE: 78 BPM | BODY MASS INDEX: 39.11 KG/M2 | TEMPERATURE: 97.8 F | OXYGEN SATURATION: 99 % | WEIGHT: 249.2 LBS | RESPIRATION RATE: 16 BRPM | SYSTOLIC BLOOD PRESSURE: 149 MMHG

## 2023-01-18 DIAGNOSIS — F32.A DEPRESSION, UNSPECIFIED DEPRESSION TYPE: ICD-10-CM

## 2023-01-18 DIAGNOSIS — L30.9 DERMATITIS: ICD-10-CM

## 2023-01-18 DIAGNOSIS — I10 ESSENTIAL HYPERTENSION: ICD-10-CM

## 2023-01-18 DIAGNOSIS — E66.01 SEVERE OBESITY (BMI 35.0-39.9) WITH COMORBIDITY (HCC): ICD-10-CM

## 2023-01-18 DIAGNOSIS — Z00.00 MEDICARE ANNUAL WELLNESS VISIT, SUBSEQUENT: Primary | ICD-10-CM

## 2023-01-18 DIAGNOSIS — M79.89 LEG SWELLING: ICD-10-CM

## 2023-01-18 DIAGNOSIS — K21.9 GASTROESOPHAGEAL REFLUX DISEASE, UNSPECIFIED WHETHER ESOPHAGITIS PRESENT: ICD-10-CM

## 2023-01-18 DIAGNOSIS — E78.5 HYPERLIPIDEMIA, UNSPECIFIED HYPERLIPIDEMIA TYPE: ICD-10-CM

## 2023-01-18 PROBLEM — F33.0 MAJOR DEPRESSIVE DISORDER, RECURRENT, MILD (HCC): Status: ACTIVE | Noted: 2023-01-18

## 2023-01-18 RX ORDER — LOSARTAN POTASSIUM 50 MG/1
50 TABLET ORAL DAILY
Qty: 90 TABLET | Refills: 1 | Status: SHIPPED | OUTPATIENT
Start: 2023-01-18

## 2023-01-18 RX ORDER — SERTRALINE HYDROCHLORIDE 50 MG/1
50 TABLET, FILM COATED ORAL DAILY
Qty: 30 TABLET | Refills: 1 | Status: SHIPPED | OUTPATIENT
Start: 2023-01-18

## 2023-01-18 RX ORDER — OMEPRAZOLE 20 MG/1
20 CAPSULE, DELAYED RELEASE ORAL DAILY
Qty: 90 CAPSULE | Refills: 1 | Status: SHIPPED | OUTPATIENT
Start: 2023-01-18

## 2023-01-18 RX ORDER — TRIAMCINOLONE ACETONIDE 1 MG/G
CREAM TOPICAL 2 TIMES DAILY
Qty: 15 G | Refills: 0 | Status: SHIPPED | OUTPATIENT
Start: 2023-01-18

## 2023-01-18 RX ORDER — PRAVASTATIN SODIUM 20 MG/1
20 TABLET ORAL DAILY
Qty: 90 TABLET | Refills: 1 | Status: SHIPPED | OUTPATIENT
Start: 2023-01-18

## 2023-01-18 RX ORDER — ONDANSETRON 4 MG/1
4 TABLET, ORALLY DISINTEGRATING ORAL
Qty: 30 TABLET | Refills: 0 | Status: SHIPPED | OUTPATIENT
Start: 2023-01-18

## 2023-01-18 RX ORDER — FUROSEMIDE 20 MG/1
20 TABLET ORAL DAILY
Qty: 10 TABLET | Refills: 2 | Status: SHIPPED | OUTPATIENT
Start: 2023-01-18

## 2023-01-18 NOTE — PROGRESS NOTES
Identified pt with two pt identifiers(name and ). Reviewed record in preparation for visit and have obtained necessary documentation. Chief Complaint   Patient presents with    Annual Wellness Visit    Labs    Medication Refill        Health Maintenance Due   Topic    DTaP/Tdap/Td series (1 - Tdap)    COVID-19 Vaccine (3 - Booster for [employer] series)    Depression Screen     Flu Vaccine (1)    Medicare Yearly Exam     Lipid Screen        Coordination of Care Questionnaire:  :   1) Have you been to an emergency room, urgent care, or hospitalized since your last visit? If yes, where when, and reason for visit? no       2. Have seen or consulted any other health care provider since your last visit? If yes, where when, and reason for visit? NO        Patient is accompanied by self I have received verbal consent from Elsie Blackmon to discuss any/all medical information while they are present in the room.

## 2023-01-18 NOTE — PROGRESS NOTES
This is the Subsequent Medicare Annual Wellness Exam, performed 12 months or more after the Initial AWV or the last Subsequent AWV    I have reviewed the patient's medical history in detail and updated the computerized patient record. Here today for medicare wellness  Mammogram done March  Bone density done in March  Sees dentist every 6 months  Vision exam up to date    HTN  Mild elevation today. Does not check at home    Lack of interest in doing things  Does want to  any of her hobbies  States she has some anxiety  Denies SI/HI  Denies feeling sad    Bilateral leg swelling only when going to the beach  Does not happen any other time  Goes in the summer    Dermatitis  Right elbow  Improves with OTC hydrocortisone     Review of Systems   Constitutional:  Negative for chills, fever and weight loss. Eyes:  Negative for blurred vision. Respiratory:  Negative for cough and shortness of breath. Cardiovascular:  Negative for chest pain, palpitations and leg swelling. Gastrointestinal:  Negative for constipation, nausea and vomiting. Genitourinary:  Negative for dysuria. Musculoskeletal:  Negative for joint pain. Skin:  Negative for rash. Neurological:  Negative for dizziness and headaches. Psychiatric/Behavioral:  Negative for substance abuse and suicidal ideas. The patient does not have insomnia. Assessment/Plan   Education and counseling provided:  Are appropriate based on today's review and evaluation    1. Medicare annual wellness visit, subsequent  -     URINALYSIS W/MICROSCOPIC; Future  - Stable, preventive screenings discussed and up-to-date  2. Gastroesophageal reflux disease, unspecified whether esophagitis present  -     omeprazole (PRILOSEC) 20 mg capsule; Take 1 Capsule by mouth daily. , Normal, Disp-90 Capsule, R-1  - Stable, continue current therapy, refill today  3. Essential hypertension  -     losartan (COZAAR) 50 mg tablet; Take 1 Tablet by mouth daily. , Normal, Disp-90 Tablet, R-1  -     METABOLIC PANEL, COMPREHENSIVE; Future  -     MICROALBUMIN, UR, RAND W/ MICROALB/CREAT RATIO; Future  - At goal today, continue current therapy, refill today, labs today. Plan for follow-up in 6 months  4. Severe obesity (BMI 35.0-39. 9) with comorbidity ([facility] [address]  - Work on diet and exercise for weight loss, decreasing carbohydrate and sugar intake and increasing activity for targeted exercise  5. Hyperlipidemia, unspecified hyperlipidemia type  -     pravastatin (PRAVACHOL) 20 mg tablet; Take 1 Tablet by mouth daily. , Normal, Disp-90 Tablet, R-1  -     LIPID PANEL; Future  - Present stable, labs today, refill today  6. Depression, unspecified depression type  -     sertraline (ZOLOFT) 50 mg tablet; Take 1 Tablet by mouth daily. , Normal, Disp-30 Tablet, R-1  - New onset, we will start sertraline as directed, side effects discussed, sent in Zofran for nausea. Follow-up in 4 weeks  7. Leg swelling  -     furosemide (LASIX) 20 mg tablet; Take 1 Tablet by mouth daily. , Normal, Disp-10 Tablet, R-2  - Sent in furosemide to take when she has her beach trip due to the consistency and leg swelling at the beach. Advised patient of side effects and that this could lower her blood pressure and she needs to be very careful and check her blood pressure  8. Dermatitis  -     triamcinolone acetonide (KENALOG) 0.1 % topical cream; Apply  to affected area two (2) times a day.  use thin layer, Normal, Disp-15 g, R-0  - Unchanged, will do a course of triamcinolone as directed, side effects       Depression Risk Factor Screening     3 most recent PHQ Screens 1/18/2023   Little interest or pleasure in doing things More than half the days   Feeling down, depressed, irritable, or hopeless Not at all   Total Score PHQ 2 2       Alcohol & Drug Abuse Risk Screen   Do you average more than 1 drink per night or more than 7 drinks a week?: (P) Yes  On any one occasion in the past three months have you had more than 3 drinks containing alcohol?: (P) Yes          Functional Ability and Level of Safety   Hearing:  Hearing: (P) Patient reports hearing is good    Activities of Daily Living: The home contains: (P) grab bars, rugs  Functional ADLs: (P) Patient does total self care   Ambulation:  Patient ambulates: (P) with no difficulty   Fall Risk:  Fall Risk Assessment, last 12 mths 1/18/2023   Able to walk? Yes   Fall in past 12 months? 0   Do you feel unsteady? 0   Are you worried about falling 0   Number of falls in past 12 months -   Fall with injury? -     Abuse Screen:  Do you ever feel afraid of your partner?: (P) No  Are you in a relationship with someone who physically or mentally threatens you?: (P) No  Is it safe for you to go home?: (P) Yes      Cognitive Screening   Has your family/caregiver stated any concerns about your memory?: (P) No   Cognitive Screening: Normal - interview    Health Maintenance Due     Health Maintenance Due   Topic Date Due    COVID-19 Vaccine (3 - Booster for Pfizer series) 06/17/2021    Depression Screen  07/26/2022    Flu Vaccine (1) 08/01/2022    Lipid Screen  01/18/2023     Physical Exam  Constitutional:       General: She is not in acute distress. Appearance: She is not diaphoretic. HENT:      Head: Normocephalic and atraumatic. Cardiovascular:      Rate and Rhythm: Normal rate and regular rhythm. Heart sounds: Normal heart sounds. No murmur heard. No friction rub. No gallop. Pulmonary:      Effort: Pulmonary effort is normal. No respiratory distress. Breath sounds: Normal breath sounds. No wheezing or rales. Skin:     General: Skin is warm and dry. Coloration: Skin is not pale. Comments: Red, dry flaky patch right elbow   Neurological:      Mental Status: She is alert. Psychiatric:         Mood and Affect: Affect is flat and tearful.        Patient Care Team   Patient Care Team:  Smitha James NP as PCP - General (Nurse Practitioner)  Smitha James NP as PCP - [facility]    History     Patient Active Problem List   Diagnosis Code    Groin pain R10.30    Hypertension I10    Back pain M54.9    Lower urinary tract infection N39.0    Severe obesity ([address] E66.01    Major depressive disorder, recurrent, mild F33.0     Past Medical History:   Diagnosis Date    Cataracts, bilateral     left eye surgery 2004 and right 11/2014    Esophageal stricture     s/p dilataion 2013    Gastroesophageal reflux disease     Hyperlipemia diet-controlled    Recurrent UTI     Thyroid nodule     dx'ed 1980's      Past Surgical History:   Procedure Laterality Date    HX OTHER SURGICAL      BTL 1980    HX OTHER SURGICAL      Vitectomy left eye 2003     HX OTHER SURGICAL      Breast Augmentation   1980's     IMPLANT BREAST SILICONE/EQ Bilateral     put in at 25 y/o     Current Outpatient Medications   Medication Sig Dispense Refill    omeprazole (PRILOSEC) 20 mg capsule Take 1 Capsule by mouth daily. 90 Capsule 1    losartan (COZAAR) 50 mg tablet Take 1 Tablet by mouth daily. 90 Tablet 1    pravastatin (PRAVACHOL) 20 mg tablet Take 1 Tablet by mouth daily. 90 Tablet 1    sertraline (ZOLOFT) 50 mg tablet Take 1 Tablet by mouth daily. 30 Tablet 1    furosemide (LASIX) 20 mg tablet Take 1 Tablet by mouth daily. 10 Tablet 2    triamcinolone acetonide (KENALOG) 0.1 % topical cream Apply  to affected area two (2) times a day. use thin layer 15 g 0     Allergies   Allergen Reactions    Ciprofibrate Unknown (comments)       Family History   Problem Relation Age of Onset    Lung Cancer Mother     Cancer Mother     Heart Disease Father      Social History     Tobacco Use    Smoking status: Never    Smokeless tobacco: Never   Substance Use Topics    Alcohol use:  Yes     Alcohol/week: 4.0 standard drinks     Types: 4 Glasses of wine per week         Licha Chun NP

## 2023-01-18 NOTE — PATIENT INSTRUCTIONS
Medicare Wellness Visit, Female     The best way to live healthy is to have a lifestyle where you eat a well-balanced diet, exercise regularly, limit alcohol use, and quit all forms of tobacco/nicotine, if applicable. Regular preventive services are another way to keep healthy. Preventive services (vaccines, screening tests, monitoring & exams) can help personalize your care plan, which helps you manage your own care. Screening tests can find health problems at the earliest stages, when they are easiest to treat. [facility] follows the current, evidence-based guidelines published by the [facility]omax ([facility]) when recommending preventive services for our patients. Because we follow these guidelines, sometimes recommendations change over time as research supports it. (For example, mammograms used to be recommended annually. Even though Medicare will still pay for an annual mammogram, the newer guidelines recommend a mammogram every two years for women of average risk). Of course, you and your doctor may decide to screen more often for some diseases, based on your risk and your co-morbidities (chronic disease you are already diagnosed with). Preventive services for you include:  - Medicare offers their members a free annual wellness visit, which is time for you and your primary care provider to discuss and plan for your preventive service needs.  Take advantage of this benefit every year!    -Over the age of 72 should receive the recommended pneumonia vaccines.    -All adults should have a flu vaccine yearly.  -All adults should have a tetanus vaccine every 10 years.   -Over the age 48 should receive the shingles vaccines.        -All adults should be screened once for Hepatitis C.  -All adults age 38-68 who are overweight should have a diabetes screening test once every three years.   -Other screening tests and preventive services for persons with diabetes include: an eye exam to screen for diabetic retinopathy, a kidney function test, a foot exam, and stricter control over your cholesterol.   -Cardiovascular screening for adults with routine risk involves an electrocardiogram (ECG) at intervals determined by your doctor.     -Colorectal cancer screenings should be done for adults age 39-70 with no increased risk factors for colorectal cancer. There are a number of acceptable methods of screening for this type of cancer. Each test has its own benefits and drawbacks. Discuss with your doctor what is most appropriate for you during your annual wellness visit. The different tests include: colonoscopy (considered the best screening method), a fecal occult blood test, a fecal DNA test, and sigmoidoscopy.    -Lung cancer screening is recommended annually with a low dose CT scan for adults between age 54 and 68, who have smoked at least 30 pack years (equivalent of 1 pack per day for 30 days), and who is a current smoker or quit less than 15 years ago.    -A bone mass density test is recommended when a woman turns 65 to screen for osteoporosis. This test is only recommended one time, as a screening. Some providers will use this same test as a disease monitoring tool if you already have osteoporosis. -Breast cancer screenings are recommended every other year for women of normal risk, age 54-69.    -Cervical cancer screenings for women over age 72 are only recommended with certain risk factors.      Here is a list of your current Health Maintenance items (your personalized list of preventive services) with a due date:  Health Maintenance Due   Topic Date Due    COVID-19 Vaccine (3 - Booster for [employer] series) 06/17/2021    Depresssion Screening  07/26/2022    Yearly Flu Vaccine (1) 08/01/2022    Cholesterol Test   01/18/2023

## 2023-01-30 DIAGNOSIS — I10 ESSENTIAL HYPERTENSION: ICD-10-CM

## 2023-01-30 RX ORDER — LOSARTAN POTASSIUM 50 MG/1
50 TABLET ORAL DAILY
Qty: 90 TABLET | Refills: 1 | Status: SHIPPED | OUTPATIENT
Start: 2023-01-30

## 2023-02-15 ENCOUNTER — VIRTUAL VISIT (OUTPATIENT)
Dept: FAMILY MEDICINE CLINIC | Age: 68
End: 2023-02-15
Payer: MEDICARE

## 2023-02-15 DIAGNOSIS — F33.0 MAJOR DEPRESSIVE DISORDER, RECURRENT, MILD (HCC): ICD-10-CM

## 2023-02-15 PROCEDURE — G8399 PT W/DXA RESULTS DOCUMENT: HCPCS | Performed by: NURSE PRACTITIONER

## 2023-02-15 PROCEDURE — 1090F PRES/ABSN URINE INCON ASSESS: CPT | Performed by: NURSE PRACTITIONER

## 2023-02-15 PROCEDURE — 99213 OFFICE O/P EST LOW 20 MIN: CPT | Performed by: NURSE PRACTITIONER

## 2023-02-15 PROCEDURE — G9899 SCRN MAM PERF RSLTS DOC: HCPCS | Performed by: NURSE PRACTITIONER

## 2023-02-15 PROCEDURE — 3017F COLORECTAL CA SCREEN DOC REV: CPT | Performed by: NURSE PRACTITIONER

## 2023-02-15 PROCEDURE — G9717 DOC PT DX DEP/BP F/U NT REQ: HCPCS | Performed by: NURSE PRACTITIONER

## 2023-02-15 PROCEDURE — 1123F ACP DISCUSS/DSCN MKR DOCD: CPT | Performed by: NURSE PRACTITIONER

## 2023-02-15 PROCEDURE — 1101F PT FALLS ASSESS-DOCD LE1/YR: CPT | Performed by: NURSE PRACTITIONER

## 2023-02-15 PROCEDURE — G8427 DOCREV CUR MEDS BY ELIG CLIN: HCPCS | Performed by: NURSE PRACTITIONER

## 2023-02-15 RX ORDER — SERTRALINE HYDROCHLORIDE 50 MG/1
50 TABLET, FILM COATED ORAL DAILY
Qty: 90 TABLET | Refills: 1 | Status: SHIPPED | OUTPATIENT
Start: 2023-02-15

## 2023-02-15 NOTE — PROGRESS NOTES
Tito Martinez is a 69 y.o. female who was seen by synchronous (real-time) audio-video technology on 2/15/2023 for No chief complaint on file. Assessment & Plan:   Diagnoses and all orders for this visit:    1. Major depressive disorder, recurrent, mild (HCC)  -     sertraline (ZOLOFT) 50 mg tablet; Take 1 Tablet by mouth daily.  - Improved, continue current therapy, refill today. Follow-up if symptoms return      I spent at least 7 minutes on this visit with this established patient. Subjective:   VV today for follow up on starting zoloft. Started on 50 mg about 4 weeks ago. Patient states she is doing great, she no longer has any anxiety or depression. She has lost 10 pounds, and does not need a glass of wine now when she feels anxious. She wants to continue current dose    Prior to Admission medications    Medication Sig Start Date End Date Taking? Authorizing Provider   sertraline (ZOLOFT) 50 mg tablet Take 1 Tablet by mouth daily. 2/15/23  Yes Mary Yancey NP   losartan (COZAAR) 50 mg tablet Take 1 Tablet by mouth daily. 1/30/23   Mary Yancey NP   omeprazole (PRILOSEC) 20 mg capsule Take 1 Capsule by mouth daily. 1/18/23   Mary Yancey NP   pravastatin (PRAVACHOL) 20 mg tablet Take 1 Tablet by mouth daily. 1/18/23   Mary Yancey NP   furosemide (LASIX) 20 mg tablet Take 1 Tablet by mouth daily. 1/18/23   Mary Yancey NP   triamcinolone acetonide (KENALOG) 0.1 % topical cream Apply  to affected area two (2) times a day. use thin layer 1/18/23   Mary Yancey NP   ondansetron (ZOFRAN ODT) 4 mg disintegrating tablet Take 1 Tablet by mouth every eight (8) hours as needed for Nausea. 1/18/23   Rima Leon NP   sertraline (ZOLOFT) 50 mg tablet Take 1 Tablet by mouth daily.  1/18/23 2/15/23  Rima Leon NP     Patient Active Problem List   Diagnosis Code    Groin pain R10.30    Hypertension I10    Back pain M54.9    Lower urinary tract infection N39.0    Severe obesity (HCC) E66.01    Major depressive disorder, recurrent, mild F33.0       Review of Systems   Constitutional:  Negative for chills, fever and malaise/fatigue. Eyes:  Negative for blurred vision. Respiratory:  Negative for cough and shortness of breath. Cardiovascular:  Negative for chest pain, palpitations and leg swelling. Neurological:  Negative for dizziness and headaches.      Objective:     Patient-Reported Vitals 2/13/2023   Patient-Reported Weight 238   Patient-Reported Pulse 66   Patient-Reported Temperature 98   Patient-Reported SpO2 98   Patient-Reported Systolic  992   Patient-Reported Diastolic 77        [INSTRUCTIONS:  \"[x]\" Indicates a positive item  \"[]\" Indicates a negative item  -- DELETE ALL ITEMS NOT EXAMINED]    Constitutional: [x] Appears well-developed and well-nourished [x] No apparent distress      [] Abnormal -     Mental status: [x] Alert and awake  [x] Oriented to person/place/time [x] Able to follow commands    [] Abnormal -     Eyes:   EOM    [x]  Normal    [] Abnormal -   Sclera  [x]  Normal    [] Abnormal -          Discharge [x]  None visible   [] Abnormal -     HENT: [x] Normocephalic, atraumatic  [] Abnormal -   [x] Mouth/Throat: Mucous membranes are moist    External Ears [x] Normal  [] Abnormal -    Neck: [x] No visualized mass [] Abnormal -     Pulmonary/Chest: [x] Respiratory effort normal   [x] No visualized signs of difficulty breathing or respiratory distress        [] Abnormal -      Musculoskeletal:   [x] Normal gait with no signs of ataxia         [x] Normal range of motion of neck        [] Abnormal -     Neurological:        [x] No Facial Asymmetry (Cranial nerve 7 motor function) (limited exam due to video visit)          [x] No gaze palsy        [] Abnormal -          Skin:        [x] No significant exanthematous lesions or discoloration noted on facial skin         [] Abnormal -            Psychiatric:       [x] Normal Affect [] Abnormal - [x] No Hallucinations    Other pertinent observable physical exam findings:-        We discussed the expected course, resolution and complications of the diagnosis(es) in detail. Medication risks, benefits, costs, interactions, and alternatives were discussed as indicated. I advised her to contact the office if her condition worsens, changes or fails to improve as anticipated. She expressed understanding with the diagnosis(es) and plan. Georgemagdalenaana maria Nelson, was evaluated through a synchronous (real-time) audio-video encounter. The patient (or guardian if applicable) is aware that this is a billable service, which includes applicable co-pays. This Virtual Visit was conducted with patient's (and/or legal guardian's) consent. The visit was conducted pursuant to the emergency declaration under the [address] [place] [address] [facility] authority and the [employer] General Act. Patient identification was verified, and a caregiver was present when appropriate. The patient was located at: Home: [address] [phone]  The provider was located at:  Facility (Appt Department): [address],

## 2023-03-01 ENCOUNTER — TRANSCRIBE ORDER (OUTPATIENT)
Dept: SCHEDULING | Age: 68
End: 2023-03-01

## 2023-03-01 DIAGNOSIS — Z12.31 BREAST CANCER SCREENING BY MAMMOGRAM: Primary | ICD-10-CM

## 2023-03-12 DIAGNOSIS — F33.0 MAJOR DEPRESSIVE DISORDER, RECURRENT, MILD (HCC): ICD-10-CM

## 2023-04-04 ENCOUNTER — HOSPITAL ENCOUNTER (OUTPATIENT)
Dept: MAMMOGRAPHY | Age: 68
End: 2023-04-04
Attending: FAMILY MEDICINE
Payer: MEDICARE

## 2023-04-04 PROCEDURE — 77067 SCR MAMMO BI INCL CAD: CPT

## 2023-04-22 DIAGNOSIS — Z12.31 BREAST CANCER SCREENING BY MAMMOGRAM: Primary | ICD-10-CM

## 2023-05-22 RX ORDER — TRIAMCINOLONE ACETONIDE 1 MG/G
CREAM TOPICAL 2 TIMES DAILY
COMMUNITY
Start: 2023-01-18

## 2023-05-22 RX ORDER — OMEPRAZOLE 20 MG/1
20 CAPSULE, DELAYED RELEASE ORAL DAILY
COMMUNITY
Start: 2023-01-18 | End: 2023-06-12

## 2023-05-22 RX ORDER — ONDANSETRON 4 MG/1
4 TABLET, ORALLY DISINTEGRATING ORAL EVERY 8 HOURS PRN
COMMUNITY
Start: 2023-01-18

## 2023-05-22 RX ORDER — PRAVASTATIN SODIUM 20 MG
20 TABLET ORAL DAILY
COMMUNITY
Start: 2023-01-18

## 2023-05-22 RX ORDER — LOSARTAN POTASSIUM 50 MG/1
50 TABLET ORAL DAILY
COMMUNITY
Start: 2023-01-30

## 2023-05-22 RX ORDER — FUROSEMIDE 20 MG/1
20 TABLET ORAL DAILY
COMMUNITY
Start: 2023-01-18

## 2023-06-10 RX ORDER — SERTRALINE HYDROCHLORIDE 50 MG/1
TABLET, FILM COATED ORAL
Qty: 30 TABLET | Refills: 1 | OUTPATIENT
Start: 2023-06-10

## 2023-06-12 RX ORDER — OMEPRAZOLE 20 MG/1
CAPSULE, DELAYED RELEASE ORAL
Qty: 90 CAPSULE | Refills: 0 | Status: SHIPPED | OUTPATIENT
Start: 2023-06-12

## 2023-06-12 NOTE — TELEPHONE ENCOUNTER
PCP: TEREZA Hurtado NP    Last appt:  No future appointments.     Requested Prescriptions     Pending Prescriptions Disp Refills    omeprazole (PRILOSEC) 20 MG delayed release capsule [Pharmacy Med Name: OMEPRAZOL RX CAP 20MG] 90 capsule 1     Sig: TAKE 1 CAPSULE DAILY       Prior labs and Blood pressures:  BP Readings from Last 3 Encounters:   01/18/23 (!) 149/84   07/26/21 (!) 158/84     Lab Results   Component Value Date/Time     01/19/2023 08:34 AM    K 5.2 01/19/2023 08:34 AM     01/19/2023 08:34 AM    CO2 29 01/19/2023 08:34 AM    BUN 16 01/19/2023 08:34 AM    GFRAA >60 01/18/2022 09:54 AM     No results found for: HBA1C, [id number]  Lab Results   Component Value Date/Time    CHOL 229 01/19/2023 08:34 AM    HDL 49 01/19/2023 08:34 AM     No results found for: VITD3, VD3RIA    Lab Results   Component Value Date/Time    TSH 1.12 01/18/2022 09:54 AM

## 2023-08-04 RX ORDER — LOSARTAN POTASSIUM 50 MG/1
50 TABLET ORAL DAILY
Qty: 30 TABLET | Refills: 0 | Status: SHIPPED | OUTPATIENT
Start: 2023-08-04 | End: 2023-08-21

## 2023-08-04 NOTE — TELEPHONE ENCOUNTER
Pt requesting refill for   losartan (COZAAR) 50 MG tablet   1/30/2023     Sig - Route:  Take 1 tablet by mouth daily - Oral    Please advise [phone]

## 2023-08-21 RX ORDER — OMEPRAZOLE 20 MG/1
CAPSULE, DELAYED RELEASE ORAL
Qty: 30 CAPSULE | Refills: 0 | Status: SHIPPED | OUTPATIENT
Start: 2023-08-21 | End: 2023-08-24 | Stop reason: SDUPTHER

## 2023-08-21 RX ORDER — LOSARTAN POTASSIUM 50 MG/1
TABLET ORAL
Qty: 14 TABLET | Refills: 0 | Status: SHIPPED | OUTPATIENT
Start: 2023-08-21 | End: 2023-08-24 | Stop reason: SDUPTHER

## 2023-08-21 NOTE — TELEPHONE ENCOUNTER
PCP: Nora Burkett MD    Last appt: [unfilled]  Last appt was on 02/15/2023. No further appts have been scheduled  No future appointments.     Requested Prescriptions     Pending Prescriptions Disp Refills    sertraline (ZOLOFT) 50 MG tablet [Pharmacy Med Name: SERTRALINE TAB 50MG] 90 tablet 1     Sig: TAKE 1 TABLET DAILY       Prior labs and Blood pressures:  BP Readings from Last 3 Encounters:   01/18/23 (!) 149/84   07/26/21 (!) 158/84     Lab Results   Component Value Date/Time     01/19/2023 08:34 AM    K 5.2 01/19/2023 08:34 AM     01/19/2023 08:34 AM    CO2 29 01/19/2023 08:34 AM    BUN 16 01/19/2023 08:34 AM    GFRAA >60 01/18/2022 09:54 AM     No results found for: HBA1C, [id number]  Lab Results   Component Value Date/Time    CHOL 229 01/19/2023 08:34 AM    HDL 49 01/19/2023 08:34 AM     No results found for: VITD3, VD3RIA    Lab Results   Component Value Date/Time    TSH 1.12 01/18/2022 09:54 AM

## 2023-08-21 NOTE — TELEPHONE ENCOUNTER
PCP: Kristen Bae MD    Last appt: [unfilled]  Last appt was on 02/15/2023. No further appts have been scheduled. No future appointments.     Requested Prescriptions     Pending Prescriptions Disp Refills    omeprazole (PRILOSEC) 20 MG delayed release capsule [Pharmacy Med Name: OMEPRAZOL RX CAP 20MG] 90 capsule 0     Sig: TAKE 1 CAPSULE DAILY    losartan (COZAAR) 50 MG tablet [Pharmacy Med Name: LOSARTAN TAB 50MG] 30 tablet 0     Sig: TAKE 1 TABLET DAILY       Prior labs and Blood pressures:  BP Readings from Last 3 Encounters:   01/18/23 (!) 149/84   07/26/21 (!) 158/84     Lab Results   Component Value Date/Time     01/19/2023 08:34 AM    K 5.2 01/19/2023 08:34 AM     01/19/2023 08:34 AM    CO2 29 01/19/2023 08:34 AM    BUN 16 01/19/2023 08:34 AM    GFRAA >60 01/18/2022 09:54 AM     No results found for: HBA1C, [id number]  Lab Results   Component Value Date/Time    CHOL 229 01/19/2023 08:34 AM    HDL 49 01/19/2023 08:34 AM     No results found for: VITD3, VD3RIA    Lab Results   Component Value Date/Time    TSH 1.12 01/18/2022 09:54 AM

## 2023-08-24 ENCOUNTER — OFFICE VISIT (OUTPATIENT)
Facility: CLINIC | Age: 68
End: 2023-08-24
Payer: MEDICARE

## 2023-08-24 VITALS
HEART RATE: 77 BPM | HEIGHT: 67 IN | DIASTOLIC BLOOD PRESSURE: 83 MMHG | BODY MASS INDEX: 36.26 KG/M2 | RESPIRATION RATE: 16 BRPM | OXYGEN SATURATION: 97 % | TEMPERATURE: 97.4 F | SYSTOLIC BLOOD PRESSURE: 148 MMHG | WEIGHT: 231 LBS

## 2023-08-24 DIAGNOSIS — E78.5 HYPERLIPIDEMIA, UNSPECIFIED HYPERLIPIDEMIA TYPE: ICD-10-CM

## 2023-08-24 DIAGNOSIS — K21.9 GASTRO-ESOPHAGEAL REFLUX DISEASE WITHOUT ESOPHAGITIS: ICD-10-CM

## 2023-08-24 DIAGNOSIS — F33.0 MAJOR DEPRESSIVE DISORDER, RECURRENT, MILD (HCC): ICD-10-CM

## 2023-08-24 DIAGNOSIS — I10 ESSENTIAL (PRIMARY) HYPERTENSION: Primary | ICD-10-CM

## 2023-08-24 PROCEDURE — 99214 OFFICE O/P EST MOD 30 MIN: CPT | Performed by: NURSE PRACTITIONER

## 2023-08-24 PROCEDURE — G8427 DOCREV CUR MEDS BY ELIG CLIN: HCPCS | Performed by: NURSE PRACTITIONER

## 2023-08-24 PROCEDURE — G8399 PT W/DXA RESULTS DOCUMENT: HCPCS | Performed by: NURSE PRACTITIONER

## 2023-08-24 PROCEDURE — 3077F SYST BP >= 140 MM HG: CPT | Performed by: NURSE PRACTITIONER

## 2023-08-24 PROCEDURE — G8417 CALC BMI ABV UP PARAM F/U: HCPCS | Performed by: NURSE PRACTITIONER

## 2023-08-24 PROCEDURE — 1090F PRES/ABSN URINE INCON ASSESS: CPT | Performed by: NURSE PRACTITIONER

## 2023-08-24 PROCEDURE — 3079F DIAST BP 80-89 MM HG: CPT | Performed by: NURSE PRACTITIONER

## 2023-08-24 PROCEDURE — 1123F ACP DISCUSS/DSCN MKR DOCD: CPT | Performed by: NURSE PRACTITIONER

## 2023-08-24 PROCEDURE — 3017F COLORECTAL CA SCREEN DOC REV: CPT | Performed by: NURSE PRACTITIONER

## 2023-08-24 PROCEDURE — 4004F PT TOBACCO SCREEN RCVD TLK: CPT | Performed by: NURSE PRACTITIONER

## 2023-08-24 RX ORDER — OMEPRAZOLE 20 MG/1
20 CAPSULE, DELAYED RELEASE ORAL DAILY
Qty: 90 CAPSULE | Refills: 0 | Status: SHIPPED | OUTPATIENT
Start: 2023-08-24

## 2023-08-24 RX ORDER — PRAVASTATIN SODIUM 20 MG
20 TABLET ORAL DAILY
Qty: 90 TABLET | Refills: 1 | Status: SHIPPED | OUTPATIENT
Start: 2023-08-24

## 2023-08-24 RX ORDER — LOSARTAN POTASSIUM 50 MG/1
50 TABLET ORAL DAILY
Qty: 90 TABLET | Refills: 1 | Status: SHIPPED | OUTPATIENT
Start: 2023-08-24

## 2023-08-24 SDOH — ECONOMIC STABILITY: HOUSING INSECURITY
IN THE LAST 12 MONTHS, WAS THERE A TIME WHEN YOU DID NOT HAVE A STEADY PLACE TO SLEEP OR SLEPT IN A SHELTER (INCLUDING NOW)?: NO

## 2023-08-24 SDOH — ECONOMIC STABILITY: FOOD INSECURITY: WITHIN THE PAST 12 MONTHS, YOU WORRIED THAT YOUR FOOD WOULD RUN OUT BEFORE YOU GOT MONEY TO BUY MORE.: NEVER TRUE

## 2023-08-24 SDOH — ECONOMIC STABILITY: INCOME INSECURITY: HOW HARD IS IT FOR YOU TO PAY FOR THE VERY BASICS LIKE FOOD, HOUSING, MEDICAL CARE, AND HEATING?: NOT HARD AT ALL

## 2023-08-24 SDOH — ECONOMIC STABILITY: FOOD INSECURITY: WITHIN THE PAST 12 MONTHS, THE FOOD YOU BOUGHT JUST DIDN'T LAST AND YOU DIDN'T HAVE MONEY TO GET MORE.: NEVER TRUE

## 2023-08-24 ASSESSMENT — PATIENT HEALTH QUESTIONNAIRE - PHQ9
SUM OF ALL RESPONSES TO PHQ QUESTIONS 1-9: 0
3. TROUBLE FALLING OR STAYING ASLEEP: 0
4. FEELING TIRED OR HAVING LITTLE ENERGY: 0
7. TROUBLE CONCENTRATING ON THINGS, SUCH AS READING THE NEWSPAPER OR WATCHING TELEVISION: 0
SUM OF ALL RESPONSES TO PHQ QUESTIONS 1-9: 0
SUM OF ALL RESPONSES TO PHQ9 QUESTIONS 1 & 2: 0
2. FEELING DOWN, DEPRESSED OR HOPELESS: 0
SUM OF ALL RESPONSES TO PHQ QUESTIONS 1-9: 0
5. POOR APPETITE OR OVEREATING: 0
9. THOUGHTS THAT YOU WOULD BE BETTER OFF DEAD, OR OF HURTING YOURSELF: 0
10. IF YOU CHECKED OFF ANY PROBLEMS, HOW DIFFICULT HAVE THESE PROBLEMS MADE IT FOR YOU TO DO YOUR WORK, TAKE CARE OF THINGS AT HOME, OR GET ALONG WITH OTHER PEOPLE: 0
SUM OF ALL RESPONSES TO PHQ QUESTIONS 1-9: 0
6. FEELING BAD ABOUT YOURSELF - OR THAT YOU ARE A FAILURE OR HAVE LET YOURSELF OR YOUR FAMILY DOWN: 0
1. LITTLE INTEREST OR PLEASURE IN DOING THINGS: 0
8. MOVING OR SPEAKING SO SLOWLY THAT OTHER PEOPLE COULD HAVE NOTICED. OR THE OPPOSITE, BEING SO FIGETY OR RESTLESS THAT YOU HAVE BEEN MOVING AROUND A LOT MORE THAN USUAL: 0

## 2023-08-24 ASSESSMENT — ENCOUNTER SYMPTOMS
EYES NEGATIVE: 1
RESPIRATORY NEGATIVE: 1
ALLERGIC/IMMUNOLOGIC NEGATIVE: 1
GASTROINTESTINAL NEGATIVE: 1

## 2023-11-16 ENCOUNTER — TELEPHONE (OUTPATIENT)
Facility: CLINIC | Age: 68
End: 2023-11-16

## 2023-11-16 DIAGNOSIS — F33.0 MAJOR DEPRESSIVE DISORDER, RECURRENT, MILD (HCC): ICD-10-CM

## 2023-11-16 NOTE — TELEPHONE ENCOUNTER
PCP: Maria R Hartman MD    Last appt: [unfilled]  No future appointments.    Requested Prescriptions     Pending Prescriptions Disp Refills    sertraline (ZOLOFT) 50 MG tablet 90 tablet 1     Sig: Take 1 tablet by mouth daily       Prior labs and Blood pressures:  BP Readings from Last 3 Encounters:   08/24/23 (!) 148/83   01/18/23 (!) 149/84   07/26/21 (!) 158/84     Lab Results   Component Value Date/Time     08/30/2023 09:14 AM    K 4.6 08/30/2023 09:14 AM     08/30/2023 09:14 AM    CO2 28 08/30/2023 09:14 AM    BUN 19 08/30/2023 09:14 AM    GFRAA >60 01/18/2022 09:54 AM     No results found for: \"HBA1C\", \"[id number]\"  Lab Results   Component Value Date/Time    CHOL 264 08/30/2023 09:14 AM    HDL 52 08/30/2023 09:14 AM     No results found for: \"VITD3\", \"VD3RIA\"    Lab Results   Component Value Date/Time    TSH 1.12 01/18/2022 09:54 AM

## 2023-11-17 NOTE — TELEPHONE ENCOUNTER
Called and scheduled an appt to repeat labs and get meds. Pt will be out of her sertraline by the time her appt is scheduled. Pt would like a short term supply.     [facility]ACY #[id number] [facility] [address] [facility] - P   [phone] - F [phone]

## 2023-11-29 ENCOUNTER — TELEMEDICINE (OUTPATIENT)
Facility: CLINIC | Age: 68
End: 2023-11-29
Payer: MEDICARE

## 2023-11-29 DIAGNOSIS — M81.0 AGE RELATED OSTEOPOROSIS, UNSPECIFIED PATHOLOGICAL FRACTURE PRESENCE: ICD-10-CM

## 2023-11-29 DIAGNOSIS — I10 ESSENTIAL (PRIMARY) HYPERTENSION: Primary | ICD-10-CM

## 2023-11-29 DIAGNOSIS — E78.2 MIXED HYPERLIPIDEMIA: ICD-10-CM

## 2023-11-29 DIAGNOSIS — F33.0 MAJOR DEPRESSIVE DISORDER, RECURRENT, MILD (HCC): ICD-10-CM

## 2023-11-29 DIAGNOSIS — K21.9 GASTROESOPHAGEAL REFLUX DISEASE, UNSPECIFIED WHETHER ESOPHAGITIS PRESENT: ICD-10-CM

## 2023-11-29 PROBLEM — M54.9 BACK PAIN: Status: RESOLVED | Noted: 2018-10-09 | Resolved: 2023-11-29

## 2023-11-29 PROBLEM — F41.9 ANXIETY: Status: ACTIVE | Noted: 2023-11-29

## 2023-11-29 PROBLEM — N39.0 LOWER URINARY TRACT INFECTION: Status: RESOLVED | Noted: 2018-10-09 | Resolved: 2023-11-29

## 2023-11-29 PROBLEM — R10.30 GROIN PAIN: Status: RESOLVED | Noted: 2018-10-09 | Resolved: 2023-11-29

## 2023-11-29 PROCEDURE — 1123F ACP DISCUSS/DSCN MKR DOCD: CPT | Performed by: STUDENT IN AN ORGANIZED HEALTH CARE EDUCATION/TRAINING PROGRAM

## 2023-11-29 PROCEDURE — 99214 OFFICE O/P EST MOD 30 MIN: CPT | Performed by: STUDENT IN AN ORGANIZED HEALTH CARE EDUCATION/TRAINING PROGRAM

## 2023-11-29 PROCEDURE — 3017F COLORECTAL CA SCREEN DOC REV: CPT | Performed by: STUDENT IN AN ORGANIZED HEALTH CARE EDUCATION/TRAINING PROGRAM

## 2023-11-29 PROCEDURE — G8399 PT W/DXA RESULTS DOCUMENT: HCPCS | Performed by: STUDENT IN AN ORGANIZED HEALTH CARE EDUCATION/TRAINING PROGRAM

## 2023-11-29 PROCEDURE — 1090F PRES/ABSN URINE INCON ASSESS: CPT | Performed by: STUDENT IN AN ORGANIZED HEALTH CARE EDUCATION/TRAINING PROGRAM

## 2023-11-29 PROCEDURE — G8427 DOCREV CUR MEDS BY ELIG CLIN: HCPCS | Performed by: STUDENT IN AN ORGANIZED HEALTH CARE EDUCATION/TRAINING PROGRAM

## 2023-11-29 RX ORDER — ALENDRONATE SODIUM 70 MG/1
70 TABLET ORAL
Qty: 12 TABLET | Refills: 1 | Status: SHIPPED | OUTPATIENT
Start: 2023-11-29

## 2023-11-29 SDOH — ECONOMIC STABILITY: INCOME INSECURITY: HOW HARD IS IT FOR YOU TO PAY FOR THE VERY BASICS LIKE FOOD, HOUSING, MEDICAL CARE, AND HEATING?: NOT HARD AT ALL

## 2023-11-29 SDOH — ECONOMIC STABILITY: FOOD INSECURITY: WITHIN THE PAST 12 MONTHS, YOU WORRIED THAT YOUR FOOD WOULD RUN OUT BEFORE YOU GOT MONEY TO BUY MORE.: NEVER TRUE

## 2023-11-29 SDOH — ECONOMIC STABILITY: FOOD INSECURITY: WITHIN THE PAST 12 MONTHS, THE FOOD YOU BOUGHT JUST DIDN'T LAST AND YOU DIDN'T HAVE MONEY TO GET MORE.: NEVER TRUE

## 2023-11-29 ASSESSMENT — ENCOUNTER SYMPTOMS
RHINORRHEA: 0
NAUSEA: 0
VOMITING: 0
SHORTNESS OF BREATH: 0
ABDOMINAL PAIN: 0
COUGH: 0

## 2023-11-29 ASSESSMENT — PATIENT HEALTH QUESTIONNAIRE - PHQ9
10. IF YOU CHECKED OFF ANY PROBLEMS, HOW DIFFICULT HAVE THESE PROBLEMS MADE IT FOR YOU TO DO YOUR WORK, TAKE CARE OF THINGS AT HOME, OR GET ALONG WITH OTHER PEOPLE: 0
SUM OF ALL RESPONSES TO PHQ QUESTIONS 1-9: 0
3. TROUBLE FALLING OR STAYING ASLEEP: 0
9. THOUGHTS THAT YOU WOULD BE BETTER OFF DEAD, OR OF HURTING YOURSELF: 0
6. FEELING BAD ABOUT YOURSELF - OR THAT YOU ARE A FAILURE OR HAVE LET YOURSELF OR YOUR FAMILY DOWN: 0
SUM OF ALL RESPONSES TO PHQ9 QUESTIONS 1 & 2: 0
2. FEELING DOWN, DEPRESSED OR HOPELESS: 0
4. FEELING TIRED OR HAVING LITTLE ENERGY: 0
SUM OF ALL RESPONSES TO PHQ QUESTIONS 1-9: 0
5. POOR APPETITE OR OVEREATING: 0
8. MOVING OR SPEAKING SO SLOWLY THAT OTHER PEOPLE COULD HAVE NOTICED. OR THE OPPOSITE, BEING SO FIGETY OR RESTLESS THAT YOU HAVE BEEN MOVING AROUND A LOT MORE THAN USUAL: 0
SUM OF ALL RESPONSES TO PHQ QUESTIONS 1-9: 0
7. TROUBLE CONCENTRATING ON THINGS, SUCH AS READING THE NEWSPAPER OR WATCHING TELEVISION: 0
SUM OF ALL RESPONSES TO PHQ QUESTIONS 1-9: 0
1. LITTLE INTEREST OR PLEASURE IN DOING THINGS: 0

## 2023-11-29 ASSESSMENT — COLUMBIA-SUICIDE SEVERITY RATING SCALE - C-SSRS
4. HAVE YOU HAD THESE THOUGHTS AND HAD SOME INTENTION OF ACTING ON THEM?: NO
3. HAVE YOU BEEN THINKING ABOUT HOW YOU MIGHT KILL YOURSELF?: NO
7. DID THIS OCCUR IN THE LAST THREE MONTHS: NO
5. HAVE YOU STARTED TO WORK OUT OR WORKED OUT THE DETAILS OF HOW TO KILL YOURSELF? DO YOU INTEND TO CARRY OUT THIS PLAN?: NO

## 2023-11-29 NOTE — PROGRESS NOTES
Yang Calero, was evaluated through a synchronous (real-time) audio-video encounter. The patient (or guardian if applicable) is aware that this is a billable service, which includes applicable co-pays. This Virtual Visit was conducted with patient's (and/or legal guardian's) consent. Patient identification was verified, and a caregiver was present when appropriate. The patient was located at Home: [phone]  Provider was located at Home ([address] [facility]): [address] (:  1955) is a Established patient, presenting virtually for evaluation of the following:    Assessment & Plan   Below is the assessment and plan developed based on review of pertinent history, physical exam, labs, studies, and medications. 1. Essential (primary) hypertension  -     Comprehensive Metabolic Panel; Future  -     CBC; Future  -     Microalbumin / Creatinine Urine Ratio; Future  -Chronic. Presumed stable. -Patient does not routinely check her blood pressure at home however will schedule appointment in about 6 weeks for in person evaluation  -Continue losartan 50 mg daily  -Increase Losartan dosage at follow-up if needed   -Check routine lab work     2. Mixed hyperlipidemia  -     Lipid Panel; Future  -Chronic. Previously elevated. -Repeat lipid panel  -If cholesterol remains uncontrolled will recommend increasing pravastatin from 20 mg to 40 mg daily    3. Major depressive disorder, recurrent, mild (HCC)  -Chronic. Stable and well-controlled. Denies any SI/HI  -Continue sertraline 50 mg daily    4. Age related osteoporosis, unspecified pathological fracture presence  -     Vitamin D 25 Hydroxy; Future  -     alendronate (FOSAMAX) 70 MG tablet;  Take 1 tablet by mouth every 7 days Take with water 30 min before first food/drink/med; avoid lying down for at least 30 min, Disp-12 tablet, R-1 Normal  -DEXA scan from 3/2022 noted osteoporosis  -Discussed risks versus benefit of

## 2023-12-01 DIAGNOSIS — I10 ESSENTIAL (PRIMARY) HYPERTENSION: ICD-10-CM

## 2023-12-01 DIAGNOSIS — E78.2 MIXED HYPERLIPIDEMIA: ICD-10-CM

## 2023-12-01 DIAGNOSIS — M81.0 AGE RELATED OSTEOPOROSIS, UNSPECIFIED PATHOLOGICAL FRACTURE PRESENCE: ICD-10-CM

## 2023-12-01 LAB
25(OH)D3 SERPL-MCNC: 28.8 NG/ML (ref 30–100)
ALBUMIN SERPL-MCNC: 4.1 G/DL (ref 3.5–5)
ALBUMIN/GLOB SERPL: 1.3 (ref 1.1–2.2)
ALP SERPL-CCNC: 110 U/L (ref 45–117)
ALT SERPL-CCNC: 31 U/L (ref 12–78)
ANION GAP SERPL CALC-SCNC: 6 MMOL/L (ref 5–15)
AST SERPL-CCNC: 15 U/L (ref 15–37)
BILIRUB SERPL-MCNC: 0.4 MG/DL (ref 0.2–1)
BUN SERPL-MCNC: 20 MG/DL (ref 6–20)
BUN/CREAT SERPL: 29 (ref 12–20)
CALCIUM SERPL-MCNC: 8.9 MG/DL (ref 8.5–10.1)
CHLORIDE SERPL-SCNC: 107 MMOL/L (ref 97–108)
CHOLEST SERPL-MCNC: 252 MG/DL
CO2 SERPL-SCNC: 27 MMOL/L (ref 21–32)
CREAT SERPL-MCNC: 0.7 MG/DL (ref 0.55–1.02)
CREAT UR-MCNC: 179 MG/DL
ERYTHROCYTE [DISTWIDTH] IN BLOOD BY AUTOMATED COUNT: 13 % (ref 11.5–14.5)
GLOBULIN SER CALC-MCNC: 3.1 G/DL (ref 2–4)
GLUCOSE SERPL-MCNC: 92 MG/DL (ref 65–100)
HCT VFR BLD AUTO: 42.8 % (ref 35–47)
HDLC SERPL-MCNC: 52 MG/DL
HDLC SERPL: 4.8 (ref 0–5)
HGB BLD-MCNC: 13.5 G/DL (ref 11.5–16)
LDLC SERPL CALC-MCNC: 169 MG/DL (ref 0–100)
MCH RBC QN AUTO: 30.4 PG (ref 26–34)
MCHC RBC AUTO-ENTMCNC: 31.5 G/DL (ref 30–36.5)
MCV RBC AUTO: 96.4 FL (ref 80–99)
MICROALBUMIN UR-MCNC: 3.15 MG/DL
MICROALBUMIN/CREAT UR-RTO: 18 MG/G (ref 0–30)
NRBC # BLD: 0 K/UL (ref 0–0.01)
NRBC BLD-RTO: 0 PER 100 WBC
PLATELET # BLD AUTO: 246 K/UL (ref 150–400)
PMV BLD AUTO: 9.2 FL (ref 8.9–12.9)
POTASSIUM SERPL-SCNC: 4.5 MMOL/L (ref 3.5–5.1)
PROT SERPL-MCNC: 7.2 G/DL (ref 6.4–8.2)
RBC # BLD AUTO: 4.44 M/UL (ref 3.8–5.2)
SODIUM SERPL-SCNC: 140 MMOL/L (ref 136–145)
TRIGL SERPL-MCNC: 155 MG/DL
VLDLC SERPL CALC-MCNC: 31 MG/DL
WBC # BLD AUTO: 5.4 K/UL (ref 3.6–11)

## 2024-01-02 DIAGNOSIS — I10 ESSENTIAL (PRIMARY) HYPERTENSION: ICD-10-CM

## 2024-01-03 NOTE — TELEPHONE ENCOUNTER
PCP: Maria R Hartman MD    Last appt: [unfilled]  Future Appointments   Date Time Provider Department Center   1/22/2024 10:00 AM Maria R Hartman MD [facility]       Requested Prescriptions     Pending Prescriptions Disp Refills    losartan (COZAAR) 50 MG tablet 90 tablet 1     Sig: Take 1 tablet by mouth daily       Prior labs and Blood pressures:  BP Readings from Last 3 Encounters:   08/24/23 (!) 148/83   01/18/23 (!) 149/84   07/26/21 (!) 158/84     Lab Results   Component Value Date/Time     12/01/2023 09:49 AM    K 4.5 12/01/2023 09:49 AM     12/01/2023 09:49 AM    CO2 27 12/01/2023 09:49 AM    BUN 20 12/01/2023 09:49 AM    GFRAA >60 01/18/2022 09:54 AM     No results found for: \"HBA1C\", \"[id number]\"  Lab Results   Component Value Date/Time    CHOL 252 12/01/2023 09:49 AM    HDL 52 12/01/2023 09:49 AM     No results found for: \"VITD3\", \"VD3RIA\"    Lab Results   Component Value Date/Time    TSH 1.12 01/18/2022 09:54 AM

## 2024-01-04 RX ORDER — LOSARTAN POTASSIUM 50 MG/1
50 TABLET ORAL DAILY
Qty: 90 TABLET | Refills: 0 | Status: SHIPPED | OUTPATIENT
Start: 2024-01-04

## 2024-01-21 SDOH — HEALTH STABILITY: PHYSICAL HEALTH: ON AVERAGE, HOW MANY DAYS PER WEEK DO YOU ENGAGE IN MODERATE TO STRENUOUS EXERCISE (LIKE A BRISK WALK)?: 0 DAYS

## 2024-01-21 ASSESSMENT — LIFESTYLE VARIABLES
HOW OFTEN DURING THE LAST YEAR HAVE YOU BEEN UNABLE TO REMEMBER WHAT HAPPENED THE NIGHT BEFORE BECAUSE YOU HAD BEEN DRINKING: 0
HOW OFTEN DURING THE LAST YEAR HAVE YOU FOUND THAT YOU WERE NOT ABLE TO STOP DRINKING ONCE YOU HAD STARTED: NEVER
HOW OFTEN DURING THE LAST YEAR HAVE YOU BEEN UNABLE TO REMEMBER WHAT HAPPENED THE NIGHT BEFORE BECAUSE YOU HAD BEEN DRINKING: NEVER
HOW OFTEN DURING THE LAST YEAR HAVE YOU HAD A FEELING OF GUILT OR REMORSE AFTER DRINKING: 0
HOW OFTEN DURING THE LAST YEAR HAVE YOU NEEDED AN ALCOHOLIC DRINK FIRST THING IN THE MORNING TO GET YOURSELF GOING AFTER A NIGHT OF HEAVY DRINKING: NEVER
HOW OFTEN DURING THE LAST YEAR HAVE YOU FAILED TO DO WHAT WAS NORMALLY EXPECTED FROM YOU BECAUSE OF DRINKING: NEVER
HOW OFTEN DO YOU HAVE A DRINK CONTAINING ALCOHOL: 5
HAS A RELATIVE, FRIEND, DOCTOR, OR ANOTHER HEALTH PROFESSIONAL EXPRESSED CONCERN ABOUT YOUR DRINKING OR SUGGESTED YOU CUT DOWN: 0
HOW OFTEN DURING THE LAST YEAR HAVE YOU NEEDED AN ALCOHOLIC DRINK FIRST THING IN THE MORNING TO GET YOURSELF GOING AFTER A NIGHT OF HEAVY DRINKING: 0
HOW OFTEN DURING THE LAST YEAR HAVE YOU HAD A FEELING OF GUILT OR REMORSE AFTER DRINKING: NEVER
HOW MANY STANDARD DRINKS CONTAINING ALCOHOL DO YOU HAVE ON A TYPICAL DAY: 1 OR 2
HOW OFTEN DO YOU HAVE A DRINK CONTAINING ALCOHOL: 4 OR MORE TIMES A WEEK
HOW OFTEN DO YOU HAVE SIX OR MORE DRINKS ON ONE OCCASION: 2
HOW MANY STANDARD DRINKS CONTAINING ALCOHOL DO YOU HAVE ON A TYPICAL DAY: 1
HOW OFTEN DURING THE LAST YEAR HAVE YOU FAILED TO DO WHAT WAS NORMALLY EXPECTED FROM YOU BECAUSE OF DRINKING: 0
HOW OFTEN DURING THE LAST YEAR HAVE YOU FOUND THAT YOU WERE NOT ABLE TO STOP DRINKING ONCE YOU HAD STARTED: 0
HAS A RELATIVE, FRIEND, DOCTOR, OR ANOTHER HEALTH PROFESSIONAL EXPRESSED CONCERN ABOUT YOUR DRINKING OR SUGGESTED YOU CUT DOWN: NO
HAVE YOU OR SOMEONE ELSE BEEN INJURED AS A RESULT OF YOUR DRINKING: NO

## 2024-01-21 ASSESSMENT — PATIENT HEALTH QUESTIONNAIRE - PHQ9
2. FEELING DOWN, DEPRESSED OR HOPELESS: 0
SUM OF ALL RESPONSES TO PHQ9 QUESTIONS 1 & 2: 0
SUM OF ALL RESPONSES TO PHQ QUESTIONS 1-9: 0
1. LITTLE INTEREST OR PLEASURE IN DOING THINGS: 0
SUM OF ALL RESPONSES TO PHQ QUESTIONS 1-9: 0

## 2024-01-22 ENCOUNTER — OFFICE VISIT (OUTPATIENT)
Facility: CLINIC | Age: 69
End: 2024-01-22
Payer: MEDICARE

## 2024-01-22 VITALS
HEART RATE: 82 BPM | TEMPERATURE: 97.8 F | WEIGHT: 243 LBS | HEIGHT: 67 IN | BODY MASS INDEX: 38.14 KG/M2 | DIASTOLIC BLOOD PRESSURE: 88 MMHG | SYSTOLIC BLOOD PRESSURE: 136 MMHG | OXYGEN SATURATION: 99 % | RESPIRATION RATE: 16 BRPM

## 2024-01-22 DIAGNOSIS — E55.9 VITAMIN D INSUFFICIENCY: ICD-10-CM

## 2024-01-22 DIAGNOSIS — M81.0 AGE RELATED OSTEOPOROSIS, UNSPECIFIED PATHOLOGICAL FRACTURE PRESENCE: ICD-10-CM

## 2024-01-22 DIAGNOSIS — I10 ESSENTIAL (PRIMARY) HYPERTENSION: ICD-10-CM

## 2024-01-22 DIAGNOSIS — F33.0 MAJOR DEPRESSIVE DISORDER, RECURRENT, MILD (HCC): ICD-10-CM

## 2024-01-22 DIAGNOSIS — E78.2 MIXED HYPERLIPIDEMIA: ICD-10-CM

## 2024-01-22 DIAGNOSIS — Z00.00 MEDICARE ANNUAL WELLNESS VISIT, SUBSEQUENT: Primary | ICD-10-CM

## 2024-01-22 PROCEDURE — G8417 CALC BMI ABV UP PARAM F/U: HCPCS | Performed by: STUDENT IN AN ORGANIZED HEALTH CARE EDUCATION/TRAINING PROGRAM

## 2024-01-22 PROCEDURE — 3079F DIAST BP 80-89 MM HG: CPT | Performed by: STUDENT IN AN ORGANIZED HEALTH CARE EDUCATION/TRAINING PROGRAM

## 2024-01-22 PROCEDURE — G0439 PPPS, SUBSEQ VISIT: HCPCS | Performed by: STUDENT IN AN ORGANIZED HEALTH CARE EDUCATION/TRAINING PROGRAM

## 2024-01-22 PROCEDURE — 99214 OFFICE O/P EST MOD 30 MIN: CPT | Performed by: STUDENT IN AN ORGANIZED HEALTH CARE EDUCATION/TRAINING PROGRAM

## 2024-01-22 PROCEDURE — 3017F COLORECTAL CA SCREEN DOC REV: CPT | Performed by: STUDENT IN AN ORGANIZED HEALTH CARE EDUCATION/TRAINING PROGRAM

## 2024-01-22 PROCEDURE — 1123F ACP DISCUSS/DSCN MKR DOCD: CPT | Performed by: STUDENT IN AN ORGANIZED HEALTH CARE EDUCATION/TRAINING PROGRAM

## 2024-01-22 PROCEDURE — G8399 PT W/DXA RESULTS DOCUMENT: HCPCS | Performed by: STUDENT IN AN ORGANIZED HEALTH CARE EDUCATION/TRAINING PROGRAM

## 2024-01-22 PROCEDURE — 3075F SYST BP GE 130 - 139MM HG: CPT | Performed by: STUDENT IN AN ORGANIZED HEALTH CARE EDUCATION/TRAINING PROGRAM

## 2024-01-22 PROCEDURE — G8484 FLU IMMUNIZE NO ADMIN: HCPCS | Performed by: STUDENT IN AN ORGANIZED HEALTH CARE EDUCATION/TRAINING PROGRAM

## 2024-01-22 PROCEDURE — G8427 DOCREV CUR MEDS BY ELIG CLIN: HCPCS | Performed by: STUDENT IN AN ORGANIZED HEALTH CARE EDUCATION/TRAINING PROGRAM

## 2024-01-22 PROCEDURE — 1036F TOBACCO NON-USER: CPT | Performed by: STUDENT IN AN ORGANIZED HEALTH CARE EDUCATION/TRAINING PROGRAM

## 2024-01-22 PROCEDURE — 1090F PRES/ABSN URINE INCON ASSESS: CPT | Performed by: STUDENT IN AN ORGANIZED HEALTH CARE EDUCATION/TRAINING PROGRAM

## 2024-01-22 RX ORDER — MULTIVIT-MIN/IRON/FOLIC ACID/K 18-600-40
CAPSULE ORAL
COMMUNITY
End: 2024-01-22

## 2024-01-22 RX ORDER — ALENDRONATE SODIUM 70 MG/1
70 TABLET ORAL
Qty: 12 TABLET | Refills: 1 | Status: SHIPPED | OUTPATIENT
Start: 2024-01-22

## 2024-01-22 RX ORDER — PRAVASTATIN SODIUM 40 MG
40 TABLET ORAL DAILY
Qty: 90 TABLET | Refills: 1 | Status: SHIPPED | OUTPATIENT
Start: 2024-01-22

## 2024-01-22 RX ORDER — LOSARTAN POTASSIUM 50 MG/1
50 TABLET ORAL DAILY
Qty: 90 TABLET | Refills: 1 | Status: SHIPPED | OUTPATIENT
Start: 2024-01-22

## 2024-01-22 ASSESSMENT — PATIENT HEALTH QUESTIONNAIRE - PHQ9
SUM OF ALL RESPONSES TO PHQ QUESTIONS 1-9: 0
SUM OF ALL RESPONSES TO PHQ9 QUESTIONS 1 & 2: 0
5. POOR APPETITE OR OVEREATING: 0
7. TROUBLE CONCENTRATING ON THINGS, SUCH AS READING THE NEWSPAPER OR WATCHING TELEVISION: 0
2. FEELING DOWN, DEPRESSED OR HOPELESS: 0
4. FEELING TIRED OR HAVING LITTLE ENERGY: 0
10. IF YOU CHECKED OFF ANY PROBLEMS, HOW DIFFICULT HAVE THESE PROBLEMS MADE IT FOR YOU TO DO YOUR WORK, TAKE CARE OF THINGS AT HOME, OR GET ALONG WITH OTHER PEOPLE: 0
SUM OF ALL RESPONSES TO PHQ QUESTIONS 1-9: 0
6. FEELING BAD ABOUT YOURSELF - OR THAT YOU ARE A FAILURE OR HAVE LET YOURSELF OR YOUR FAMILY DOWN: 0
SUM OF ALL RESPONSES TO PHQ QUESTIONS 1-9: 0
SUM OF ALL RESPONSES TO PHQ QUESTIONS 1-9: 0
3. TROUBLE FALLING OR STAYING ASLEEP: 0
9. THOUGHTS THAT YOU WOULD BE BETTER OFF DEAD, OR OF HURTING YOURSELF: 0
1. LITTLE INTEREST OR PLEASURE IN DOING THINGS: 0
8. MOVING OR SPEAKING SO SLOWLY THAT OTHER PEOPLE COULD HAVE NOTICED. OR THE OPPOSITE, BEING SO FIGETY OR RESTLESS THAT YOU HAVE BEEN MOVING AROUND A LOT MORE THAN USUAL: 0

## 2024-01-22 ASSESSMENT — ENCOUNTER SYMPTOMS
VOMITING: 0
SHORTNESS OF BREATH: 0
NAUSEA: 0
ABDOMINAL PAIN: 0
RHINORRHEA: 0
COUGH: 0

## 2024-01-22 NOTE — PROGRESS NOTES
Identified pt with two pt identifiers(name and ). Reviewed record in preparation for visit and have obtained necessary documentation.  Chief Complaint   Patient presents with    Medicare AWV        Health Maintenance Due   Topic    DTaP/Tdap/Td vaccine (1 - Tdap)    Colorectal Cancer Screen     Respiratory Syncytial Virus (RSV) Pregnant or age 60 yrs+ (1 - 1-dose 60+ series)    Pneumococcal 65+ years Vaccine (2 - PPSV23 or PCV20)    Flu vaccine (1)    COVID-19 Vaccine (3 -  season)    Annual Wellness Visit (Medicare)        Coordination of Care Questionnaire:  :   1) Have you been to an emergency room, urgent care, or hospitalized since your last visit?  If yes, where when, and reason for visit? no      2. Have seen or consulted any other health care provider since your last visit?   If yes, where when, and reason for visit?  no        Patient is accompanied by self I have received verbal consent from Bhavya Davis to discuss any/all medical information while they are present in the room.    
by mouth daily, Disp-90 tablet, R-1Normal  Vitamin D insufficiency  Age related osteoporosis, unspecified pathological fracture presence  -     alendronate (FOSAMAX) 70 MG tablet; Take 1 tablet by mouth every 7 days Take with water 30 min before first food/drink/med; avoid lying down for at least 30 min, Disp-12 tablet, R-1Normal  Essential (primary) hypertension  -     losartan (COZAAR) 50 MG tablet; Take 1 tablet by mouth daily, Disp-90 tablet, R-1Normal  Major depressive disorder, recurrent, mild (HCC)  -     sertraline (ZOLOFT) 50 MG tablet; Take 1 tablet by mouth daily, Disp-90 tablet, R-1Normal    Recommendations for Preventive Services Due: see orders and patient instructions/AVS.  Recommended screening schedule for the next 5-10 years is provided to the patient in written form: see Patient Instructions/AVS.     Return in about 3 months (around 4/22/2024), or if symptoms worsen or fail to improve, for HLD f/u .     Subjective       Patient's complete Health Risk Assessment and screening values have been reviewed and are found in Flowsheets. The following problems were reviewed today and where indicated follow up appointments were made and/or referrals ordered.    Positive Risk Factor Screenings with Interventions:       Cognitive:   Clock Drawing Test (CDT): Normal  Words recalled: 3 Words Recalled     Total Score Interpretation: Abnormal Mini-Cog     Alcohol Screening:  Alcohol Use: Heavy Drinker (1/21/2024)    AUDIT-C     Frequency of Alcohol Consumption: 4 or more times a week     Average Number of Drinks: 1 or 2     Frequency of Binge Drinking: Less than monthly      AUDIT-C Score: 5   AUDIT Total Score: 5    Interpretation of AUDIT-C score:   3-7 indicates potential alcohol risk.    8 or more is associated with harmful or hazardous drinking.   13 or more in women, and 15 or more in men, is likely to indicate alcohol dependence.  Interventions:  Patient comments: was more over the holidays, has been

## 2024-01-22 NOTE — PATIENT INSTRUCTIONS
9 Ways to Cut Back on Drinking  Maybe you've found yourself drinking more alcohol than you'd prefer. If you want to cut back, here are some ideas to try.    Think before you drink.  Do you really want a drink, or is it just a habit? If you're used to having a drink at a certain time, try doing something else then.     Look for substitutes.  Find some no-alcohol drinks that you enjoy, like flavored seltzer water, tea with honey, or tonic with a slice of lime. Or try alcohol-free beer or \"virgin\" cocktails (without the alcohol).     Drink more water.  Use water to quench your thirst. Drink a glass of water before you have any alcohol. Have another glass along with every drink or between drinks.     Shrink your drink.  For example, have a bottle of beer instead of a pint. Use a smaller glass for wine. Choose drinks with lower alcohol content (ABV%). Or use less liquor and more mixer in cocktails.     Slow down.  It's easy to drink quickly and without thinking about it. Pay attention, and make each drink last longer.     Do the math.  Total up how much you spend on alcohol each month. How much is that a year? If you cut back, what could you do with the money you save?     Take a break.  Choose a day or two each week when you won't drink at all. Notice how you feel on those days, physically and emotionally. How did you sleep? Do you feel better? Over time, add more break days.     Count calories.  Would you like to lose some weight? For some people that's a good motivator for cutting back. Figure out how many calories are in each drink. How many does that add up to in a day? In a week? In a month?     Practice saying no.  Be ready when someone offers you a drink. Try: \"Thanks, I've had enough.\" Or \"Thanks, but I'm cutting back.\" Or \"No, thanks. I feel better when I drink less.\"   Current as of: March 21, 2023               Content Version: 13.9  © 2520-7816 [employer].   Care instructions adapted under

## 2024-03-04 DIAGNOSIS — K21.9 GASTRO-ESOPHAGEAL REFLUX DISEASE WITHOUT ESOPHAGITIS: ICD-10-CM

## 2024-03-04 NOTE — TELEPHONE ENCOUNTER
PCP: Maria R Hartman MD    Last appt: [unfilled]  Future Appointments   Date Time Provider Department Center   4/22/2024 10:00 AM Maria R Hartman MD [facility]       Requested Prescriptions     Pending Prescriptions Disp Refills    omeprazole (PRILOSEC) 20 MG delayed release capsule 90 capsule 0     Sig: Take 1 capsule by mouth daily       Prior labs and Blood pressures:  BP Readings from Last 3 Encounters:   01/22/24 136/88   08/24/23 (!) 148/83   01/18/23 (!) 149/84     Lab Results   Component Value Date/Time     12/01/2023 09:49 AM    K 4.5 12/01/2023 09:49 AM     12/01/2023 09:49 AM    CO2 27 12/01/2023 09:49 AM    BUN 20 12/01/2023 09:49 AM    GFRAA >60 01/18/2022 09:54 AM     No results found for: \"HBA1C\", \"[id number]\"  Lab Results   Component Value Date/Time    CHOL 252 12/01/2023 09:49 AM    HDL 52 12/01/2023 09:49 AM     No results found for: \"VITD3\", \"VD3RIA\"    Lab Results   Component Value Date/Time    TSH 1.12 01/18/2022 09:54 AM

## 2024-03-05 RX ORDER — OMEPRAZOLE 20 MG/1
20 CAPSULE, DELAYED RELEASE ORAL DAILY
Qty: 90 CAPSULE | Refills: 1 | Status: SHIPPED | OUTPATIENT
Start: 2024-03-05

## 2024-03-28 DIAGNOSIS — I10 ESSENTIAL (PRIMARY) HYPERTENSION: ICD-10-CM

## 2024-04-01 RX ORDER — LOSARTAN POTASSIUM 50 MG/1
50 TABLET ORAL DAILY
Qty: 90 TABLET | Refills: 1 | OUTPATIENT
Start: 2024-04-01

## 2024-04-01 NOTE — TELEPHONE ENCOUNTER
Chief Complaint   Patient presents with    Medication Refill       Requested Prescriptions     Pending Prescriptions Disp Refills    losartan (COZAAR) 50 MG tablet 90 tablet 1     Sig: Take 1 tablet by mouth daily       Allergies:  Allergies   Allergen Reactions    Ciprofibrate      Other reaction(s): Unknown (comments)       Last visit with clinic:  1/22/2024   Next visit with clinic: 4/22/2024     Last visit with this provider: 1/22/2024   Next Visit with this provider: 4/22/2024    Signed by Meghna English MA CMA  04/01/24  1:03 PM

## 2024-04-25 ENCOUNTER — HOSPITAL ENCOUNTER (OUTPATIENT)
Facility: HOSPITAL | Age: 69
Discharge: HOME | End: 2024-04-25
Attending: STUDENT IN AN ORGANIZED HEALTH CARE EDUCATION/TRAINING PROGRAM
Payer: MEDICARE

## 2024-04-25 VITALS — BODY MASS INDEX: 38.14 KG/M2 | HEIGHT: 67 IN | WEIGHT: 243 LBS

## 2024-04-25 DIAGNOSIS — Z12.31 VISIT FOR SCREENING MAMMOGRAM: ICD-10-CM

## 2024-04-25 PROCEDURE — 77067 SCR MAMMO BI INCL CAD: CPT

## 2024-05-07 DIAGNOSIS — E78.2 MIXED HYPERLIPIDEMIA: ICD-10-CM

## 2024-05-07 RX ORDER — PRAVASTATIN SODIUM 40 MG
40 TABLET ORAL DAILY
Qty: 90 TABLET | Refills: 1 | OUTPATIENT
Start: 2024-05-07

## 2024-05-07 NOTE — TELEPHONE ENCOUNTER
PCP: Maria R Hartman MD    Last appt: [unfilled]  Future Appointments   Date Time Provider Department Center   5/10/2024 10:20 AM Maria R Hartman MD [facility]       Requested Prescriptions      No prescriptions requested or ordered in this encounter       Prior labs and Blood pressures:  BP Readings from Last 3 Encounters:   01/22/24 136/88   08/24/23 (!) 148/83   01/18/23 (!) 149/84     Lab Results   Component Value Date/Time     12/01/2023 09:49 AM    K 4.5 12/01/2023 09:49 AM     12/01/2023 09:49 AM    CO2 27 12/01/2023 09:49 AM    BUN 20 12/01/2023 09:49 AM    GFRAA >60 01/18/2022 09:54 AM     No results found for: \"HBA1C\", \"[id number]\"  Lab Results   Component Value Date/Time    CHOL 252 12/01/2023 09:49 AM    HDL 52 12/01/2023 09:49 AM     12/01/2023 09:49 AM    VLDL 31 12/01/2023 09:49 AM     No results found for: \"VITD3\", \"VD3RIA\"    Lab Results   Component Value Date/Time    TSH 1.12 01/18/2022 09:54 AM

## 2024-05-10 ENCOUNTER — OFFICE VISIT (OUTPATIENT)
Facility: CLINIC | Age: 69
End: 2024-05-10

## 2024-05-10 VITALS
HEART RATE: 85 BPM | HEIGHT: 67 IN | BODY MASS INDEX: 38.45 KG/M2 | WEIGHT: 245 LBS | RESPIRATION RATE: 17 BRPM | SYSTOLIC BLOOD PRESSURE: 122 MMHG | TEMPERATURE: 97.7 F | OXYGEN SATURATION: 96 % | DIASTOLIC BLOOD PRESSURE: 78 MMHG

## 2024-05-10 DIAGNOSIS — E78.2 MIXED HYPERLIPIDEMIA: ICD-10-CM

## 2024-05-10 DIAGNOSIS — I10 ESSENTIAL (PRIMARY) HYPERTENSION: Primary | ICD-10-CM

## 2024-05-10 DIAGNOSIS — M81.0 AGE RELATED OSTEOPOROSIS, UNSPECIFIED PATHOLOGICAL FRACTURE PRESENCE: ICD-10-CM

## 2024-05-10 RX ORDER — ALENDRONATE SODIUM 70 MG/1
70 TABLET ORAL
Qty: 12 TABLET | Refills: 1 | Status: SHIPPED | OUTPATIENT
Start: 2024-05-10

## 2024-05-10 ASSESSMENT — ENCOUNTER SYMPTOMS
SHORTNESS OF BREATH: 0
VOMITING: 0
COUGH: 0
NAUSEA: 0
RHINORRHEA: 0
ABDOMINAL PAIN: 0

## 2024-05-10 NOTE — PROGRESS NOTES
Assessment/Plan:     Diagnoses and all orders for this visit:    Essential (primary) hypertension  -     Comprehensive Metabolic Panel; Future  -Chronic.  Well-controlled.  -Continue current dosage of losartan 50 mg daily    Mixed hyperlipidemia  -     Comprehensive Metabolic Panel; Future  -     Lipid Panel; Future  -Chronic.  Presumed stable.  -Due for repeat lipid panel.  -Continue pravastatin 40 mg daily.    Age related osteoporosis, unspecified pathological fracture presence  -     DEXA BONE DENSITY AXIAL SKELETON; Future  -Chronic.  Presumed stable.  -Repeat DEXA scan.  -Continue alendronate weekly.  -Continue on vitamin D supplementation       Return in about 6 months (around 11/10/2024), or if symptoms worsen or fail to improve.     Discussed expected course/resolution/complications of diagnosis in detail with patient.    Medication risks/benefits/costs/interactions/alternatives discussed with patient.    Pt expressed understanding with the diagnosis and plan      Subjective:      Bhavya Davis is a 68 y.o. female who presents for had concerns including routine follow up visit and Cholesterol Problem.     Current Outpatient Medications   Medication Sig Dispense Refill    alendronate (FOSAMAX) 70 MG tablet Take 1 tablet by mouth every 7 days Take with water 30 min before first food/drink/med; avoid lying down for at least 30 min 12 tablet 1    omeprazole (PRILOSEC) 20 MG delayed release capsule Take 1 capsule by mouth daily 90 capsule 1    pravastatin (PRAVACHOL) 40 MG tablet Take 1 tablet by mouth daily 90 tablet 1    vitamin D (CHOLECALCIFEROL) 25 MCG (1000 UT) TABS tablet Take 1 tablet by mouth daily      losartan (COZAAR) 50 MG tablet Take 1 tablet by mouth daily 90 tablet 1    sertraline (ZOLOFT) 50 MG tablet Take 1 tablet by mouth daily 90 tablet 1    Probiotic Product (PROBIOTIC ADVANCED PO) Take by mouth daily      Multiple Vitamin (MULTIVITAMIN ADULT PO) Take 1 tablet by mouth daily       No

## 2024-05-10 NOTE — PROGRESS NOTES
Identified patient with two patient identifiers (name and ). Reviewed chart in preparation for visit and have obtained necessary documentation.    Bhavya Davis is a 68 y.o. female  Chief Complaint   Patient presents with    routine follow up visit     /78 (Site: Left Upper Arm, Position: Sitting, Cuff Size: Large Adult)   Pulse 85   Temp 97.7 °F (36.5 °C) (Oral)   Resp 17   Ht 1.702 m (5' 7\")   Wt 111.1 kg (245 lb)   SpO2 96%   BMI 38.37 kg/m²     1. Have you been to the ER, urgent care clinic since your last visit?  Hospitalized since your last visit?no    2. Have you seen or consulted any other health care providers outside of the [facility] since your last visit?  Include any pap smears or colon screening. no

## 2024-05-20 ENCOUNTER — HOSPITAL ENCOUNTER (OUTPATIENT)
Facility: HOSPITAL | Age: 69
Discharge: HOME | End: 2024-05-23
Attending: STUDENT IN AN ORGANIZED HEALTH CARE EDUCATION/TRAINING PROGRAM
Payer: MEDICARE

## 2024-05-20 DIAGNOSIS — M81.0 AGE RELATED OSTEOPOROSIS, UNSPECIFIED PATHOLOGICAL FRACTURE PRESENCE: ICD-10-CM

## 2024-05-20 PROCEDURE — 77080 DXA BONE DENSITY AXIAL: CPT

## 2024-08-05 DIAGNOSIS — F33.0 MAJOR DEPRESSIVE DISORDER, RECURRENT, MILD (HCC): ICD-10-CM

## 2024-08-23 DIAGNOSIS — E78.2 MIXED HYPERLIPIDEMIA: ICD-10-CM

## 2024-08-23 RX ORDER — PRAVASTATIN SODIUM 40 MG
40 TABLET ORAL DAILY
Qty: 90 TABLET | Refills: 1 | Status: SHIPPED | OUTPATIENT
Start: 2024-08-23

## 2024-08-25 DIAGNOSIS — K21.9 GASTRO-ESOPHAGEAL REFLUX DISEASE WITHOUT ESOPHAGITIS: ICD-10-CM

## 2024-09-22 DIAGNOSIS — I10 ESSENTIAL (PRIMARY) HYPERTENSION: ICD-10-CM

## 2024-09-24 DIAGNOSIS — I10 ESSENTIAL (PRIMARY) HYPERTENSION: ICD-10-CM

## 2024-09-24 RX ORDER — LOSARTAN POTASSIUM 50 MG/1
50 TABLET ORAL DAILY
Qty: 90 TABLET | Refills: 0 | Status: SHIPPED | OUTPATIENT
Start: 2024-09-24

## 2024-09-24 RX ORDER — LOSARTAN POTASSIUM 50 MG/1
50 TABLET ORAL DAILY
Qty: 90 TABLET | Refills: 1 | OUTPATIENT
Start: 2024-09-24

## 2024-09-24 NOTE — TELEPHONE ENCOUNTER
PCP: Maria R Hartman MD    Last appt: [unfilled]  No future appointments.    Requested Prescriptions     Pending Prescriptions Disp Refills    losartan (COZAAR) 50 MG tablet [Pharmacy Med Name: LOSARTAN POTASSIUM 50 MG TAB] 90 tablet 1     Sig: TAKE 1 TABLET BY MOUTH EVERY DAY       Prior labs and Blood pressures:  BP Readings from Last 3 Encounters:   05/10/24 122/78   01/22/24 136/88   08/24/23 (!) 148/83     Lab Results   Component Value Date/Time     05/15/2024 09:17 AM    K 4.8 05/15/2024 09:17 AM     05/15/2024 09:17 AM    CO2 29 05/15/2024 09:17 AM    BUN 20 05/15/2024 09:17 AM    GFRAA >60 01/18/2022 09:54 AM     No results found for: \"HBA1C\", \"[id number]\"  Lab Results   Component Value Date/Time    CHOL 220 05/15/2024 09:17 AM    HDL 49 05/15/2024 09:17 AM    .8 05/15/2024 09:17 AM     12/01/2023 09:49 AM    VLDL 23.2 05/15/2024 09:17 AM     No results found for: \"VITD3\"    Lab Results   Component Value Date/Time    TSH 1.12 01/18/2022 09:54 AM

## 2024-10-22 DIAGNOSIS — M81.0 AGE RELATED OSTEOPOROSIS, UNSPECIFIED PATHOLOGICAL FRACTURE PRESENCE: ICD-10-CM

## 2024-10-23 RX ORDER — ALENDRONATE SODIUM 70 MG/1
TABLET ORAL
Qty: 12 TABLET | Refills: 1 | Status: SHIPPED | OUTPATIENT
Start: 2024-10-23

## 2024-11-10 DIAGNOSIS — F33.0 MAJOR DEPRESSIVE DISORDER, RECURRENT, MILD (HCC): ICD-10-CM

## 2024-11-10 DIAGNOSIS — K21.9 GASTRO-ESOPHAGEAL REFLUX DISEASE WITHOUT ESOPHAGITIS: ICD-10-CM

## 2024-11-17 DIAGNOSIS — I10 ESSENTIAL (PRIMARY) HYPERTENSION: ICD-10-CM

## 2024-11-19 RX ORDER — LOSARTAN POTASSIUM 50 MG/1
50 TABLET ORAL DAILY
Qty: 90 TABLET | Refills: 0 | Status: SHIPPED | OUTPATIENT
Start: 2024-11-19

## 2025-02-13 DIAGNOSIS — E78.2 MIXED HYPERLIPIDEMIA: ICD-10-CM

## 2025-02-13 DIAGNOSIS — K21.9 GASTRO-ESOPHAGEAL REFLUX DISEASE WITHOUT ESOPHAGITIS: ICD-10-CM

## 2025-02-17 RX ORDER — PRAVASTATIN SODIUM 40 MG
40 TABLET ORAL DAILY
Qty: 90 TABLET | Refills: 0 | Status: SHIPPED | OUTPATIENT
Start: 2025-02-17

## 2025-02-17 RX ORDER — OMEPRAZOLE 20 MG/1
CAPSULE, DELAYED RELEASE ORAL DAILY
Qty: 90 CAPSULE | Refills: 0 | Status: SHIPPED | OUTPATIENT
Start: 2025-02-17

## 2025-03-04 SDOH — ECONOMIC STABILITY: FOOD INSECURITY: WITHIN THE PAST 12 MONTHS, THE FOOD YOU BOUGHT JUST DIDN'T LAST AND YOU DIDN'T HAVE MONEY TO GET MORE.: NEVER TRUE

## 2025-03-04 SDOH — ECONOMIC STABILITY: INCOME INSECURITY: IN THE LAST 12 MONTHS, WAS THERE A TIME WHEN YOU WERE NOT ABLE TO PAY THE MORTGAGE OR RENT ON TIME?: NO

## 2025-03-04 SDOH — ECONOMIC STABILITY: TRANSPORTATION INSECURITY
IN THE PAST 12 MONTHS, HAS LACK OF TRANSPORTATION KEPT YOU FROM MEETINGS, WORK, OR FROM GETTING THINGS NEEDED FOR DAILY LIVING?: NO

## 2025-03-04 SDOH — ECONOMIC STABILITY: FOOD INSECURITY: WITHIN THE PAST 12 MONTHS, YOU WORRIED THAT YOUR FOOD WOULD RUN OUT BEFORE YOU GOT MONEY TO BUY MORE.: NEVER TRUE

## 2025-03-04 SDOH — ECONOMIC STABILITY: TRANSPORTATION INSECURITY
IN THE PAST 12 MONTHS, HAS THE LACK OF TRANSPORTATION KEPT YOU FROM MEDICAL APPOINTMENTS OR FROM GETTING MEDICATIONS?: NO

## 2025-03-05 ENCOUNTER — OFFICE VISIT (OUTPATIENT)
Facility: CLINIC | Age: 70
End: 2025-03-05

## 2025-03-05 VITALS
HEIGHT: 67 IN | OXYGEN SATURATION: 95 % | HEART RATE: 89 BPM | RESPIRATION RATE: 20 BRPM | BODY MASS INDEX: 39.11 KG/M2 | WEIGHT: 249.2 LBS | TEMPERATURE: 97.9 F | DIASTOLIC BLOOD PRESSURE: 82 MMHG | SYSTOLIC BLOOD PRESSURE: 127 MMHG

## 2025-03-05 DIAGNOSIS — Z13.1 ENCOUNTER FOR SCREENING FOR DIABETES MELLITUS: ICD-10-CM

## 2025-03-05 DIAGNOSIS — Z12.11 COLON CANCER SCREENING: ICD-10-CM

## 2025-03-05 DIAGNOSIS — E78.2 MIXED HYPERLIPIDEMIA: ICD-10-CM

## 2025-03-05 DIAGNOSIS — M48.54XA NONTRAUMATIC COMPRESSION FRACTURE OF T11 VERTEBRA, INITIAL ENCOUNTER (HCC): ICD-10-CM

## 2025-03-05 DIAGNOSIS — I10 ESSENTIAL (PRIMARY) HYPERTENSION: Primary | ICD-10-CM

## 2025-03-05 DIAGNOSIS — J30.9 ALLERGIC RHINITIS, UNSPECIFIED SEASONALITY, UNSPECIFIED TRIGGER: ICD-10-CM

## 2025-03-05 DIAGNOSIS — F41.9 ANXIETY: ICD-10-CM

## 2025-03-05 DIAGNOSIS — M81.0 AGE RELATED OSTEOPOROSIS, UNSPECIFIED PATHOLOGICAL FRACTURE PRESENCE: ICD-10-CM

## 2025-03-05 DIAGNOSIS — K21.9 GASTROESOPHAGEAL REFLUX DISEASE, UNSPECIFIED WHETHER ESOPHAGITIS PRESENT: ICD-10-CM

## 2025-03-05 RX ORDER — SERTRALINE HYDROCHLORIDE 100 MG/1
100 TABLET, FILM COATED ORAL DAILY
Qty: 90 TABLET | Refills: 0 | Status: SHIPPED | OUTPATIENT
Start: 2025-03-05

## 2025-03-05 RX ORDER — FLUTICASONE PROPIONATE 50 MCG
2 SPRAY, SUSPENSION (ML) NASAL DAILY PRN
Qty: 16 G | Refills: 1 | Status: SHIPPED | OUTPATIENT
Start: 2025-03-05

## 2025-03-05 RX ORDER — LOSARTAN POTASSIUM 50 MG/1
50 TABLET ORAL DAILY
Qty: 90 TABLET | Refills: 1 | Status: SHIPPED | OUTPATIENT
Start: 2025-03-05

## 2025-03-05 RX ORDER — ALENDRONATE SODIUM 70 MG/1
TABLET ORAL
Qty: 12 TABLET | Refills: 1 | Status: SHIPPED | OUTPATIENT
Start: 2025-03-05

## 2025-03-05 RX ORDER — OMEPRAZOLE 20 MG/1
20 CAPSULE, DELAYED RELEASE ORAL DAILY
Qty: 90 CAPSULE | Refills: 1 | Status: SHIPPED | OUTPATIENT
Start: 2025-03-05

## 2025-03-05 RX ORDER — PRAVASTATIN SODIUM 40 MG
40 TABLET ORAL DAILY
Qty: 90 TABLET | Refills: 0 | Status: SHIPPED | OUTPATIENT
Start: 2025-03-05

## 2025-03-05 SDOH — ECONOMIC STABILITY: FOOD INSECURITY: WITHIN THE PAST 12 MONTHS, YOU WORRIED THAT YOUR FOOD WOULD RUN OUT BEFORE YOU GOT MONEY TO BUY MORE.: NEVER TRUE

## 2025-03-05 SDOH — ECONOMIC STABILITY: FOOD INSECURITY: WITHIN THE PAST 12 MONTHS, THE FOOD YOU BOUGHT JUST DIDN'T LAST AND YOU DIDN'T HAVE MONEY TO GET MORE.: NEVER TRUE

## 2025-03-05 ASSESSMENT — ENCOUNTER SYMPTOMS
SHORTNESS OF BREATH: 0
NAUSEA: 0
RHINORRHEA: 1
COUGH: 0
ABDOMINAL PAIN: 0
VOMITING: 0

## 2025-03-05 ASSESSMENT — PATIENT HEALTH QUESTIONNAIRE - PHQ9
5. POOR APPETITE OR OVEREATING: NOT AT ALL
SUM OF ALL RESPONSES TO PHQ QUESTIONS 1-9: 0
9. THOUGHTS THAT YOU WOULD BE BETTER OFF DEAD, OR OF HURTING YOURSELF: NOT AT ALL
6. FEELING BAD ABOUT YOURSELF - OR THAT YOU ARE A FAILURE OR HAVE LET YOURSELF OR YOUR FAMILY DOWN: NOT AT ALL
SUM OF ALL RESPONSES TO PHQ QUESTIONS 1-9: 0
3. TROUBLE FALLING OR STAYING ASLEEP: NOT AT ALL
8. MOVING OR SPEAKING SO SLOWLY THAT OTHER PEOPLE COULD HAVE NOTICED. OR THE OPPOSITE, BEING SO FIGETY OR RESTLESS THAT YOU HAVE BEEN MOVING AROUND A LOT MORE THAN USUAL: NOT AT ALL
SUM OF ALL RESPONSES TO PHQ QUESTIONS 1-9: 0
4. FEELING TIRED OR HAVING LITTLE ENERGY: NOT AT ALL
SUM OF ALL RESPONSES TO PHQ QUESTIONS 1-9: 0
7. TROUBLE CONCENTRATING ON THINGS, SUCH AS READING THE NEWSPAPER OR WATCHING TELEVISION: NOT AT ALL
2. FEELING DOWN, DEPRESSED OR HOPELESS: NOT AT ALL
1. LITTLE INTEREST OR PLEASURE IN DOING THINGS: NOT AT ALL
10. IF YOU CHECKED OFF ANY PROBLEMS, HOW DIFFICULT HAVE THESE PROBLEMS MADE IT FOR YOU TO DO YOUR WORK, TAKE CARE OF THINGS AT HOME, OR GET ALONG WITH OTHER PEOPLE: NOT DIFFICULT AT ALL

## 2025-03-05 NOTE — PROGRESS NOTES
Assessment/Plan:     Diagnoses and all orders for this visit:    Essential (primary) hypertension  -     losartan (COZAAR) 50 MG tablet; Take 1 tablet by mouth daily  -     CBC; Future  -     Comprehensive Metabolic Panel; Future  -     Albumin/Creatinine Ratio, Urine; Future  -     TSH; Future  -Chronic.  Stable.  -Check routine lab work today  -Continue current regimen of losartan 50 mg daily    Anxiety  -     sertraline (ZOLOFT) 100 MG tablet; Take 1 tablet by mouth daily  -Chronic.  Uncontrolled.  -Previously had been noting benefit on sertraline  -Therefore will go ahead and increase her sertraline from 50 to 100 mg daily  -Recommend follow-up in 4 to 6 weeks for reevaluation or sooner if needed    Mixed hyperlipidemia  -     pravastatin (PRAVACHOL) 40 MG tablet; Take 1 tablet by mouth daily  -     Comprehensive Metabolic Panel; Future  -     Lipid Panel; Future  -Chronic.  Due for repeat lipid panel check  -Continue pravastatin daily  -Based on results may need to consider changing statin to a high intensity statin    Gastroesophageal reflux disease, unspecified whether esophagitis present  -     omeprazole (PRILOSEC) 20 MG delayed release capsule; Take 1 capsule by mouth daily  -Chronic.  Stable.  -Patient states she has previously had an EGD as well as esophageal dilation  -Symptoms return if she comes off medication  -Continue omeprazole daily    Age related osteoporosis, unspecified pathological fracture presence  -     alendronate (FOSAMAX) 70 MG tablet; TAKE 1 TAB EVERY 7 DAYS WITH WATER 30 MIN BEFORE FIRST FOOD/DRINK/MED AVOID LAYING DOWN FOR 30 MIN  -     Vitamin D 25 Hydroxy; Future  -Chronic.  Stable.  -Continue vitamin D supplementation and multivitamin with calcium  -Continue alendronate weekly  -Is up-to-date on DEXA scan, due for repeat in 2026    Nontraumatic compression fracture of T11 vertebra, initial encounter ([facility])  -     XR THORACIC SPINE (3 VIEWS); Future  -DEXA scan noted incidental

## 2025-03-05 NOTE — PROGRESS NOTES
dentified pt with two pt identifiers(name and ).    Chief Complaint   Patient presents with    Medication Refill     Patient here for refills Alendronate, Omeprazole, Losartan, Pravastatin, Sertraline        Health Maintenance Due   Topic    DTaP/Tdap/Td vaccine (1 - Tdap)    Colorectal Cancer Screen     Respiratory Syncytial Virus (RSV) Pregnant or age 60 yrs+ (1 - Risk 60-74 years 1-dose series)    Pneumococcal 50+ years Vaccine (2 of 2 - PPSV23)    COVID-19 Vaccine (3 -  season)    Depression Monitoring     Annual Wellness Visit (Medicare)        Wt Readings from Last 3 Encounters:   25 113 kg (249 lb 3.2 oz)   05/10/24 111.1 kg (245 lb)   24 110.2 kg (243 lb)     Temp Readings from Last 3 Encounters:   05/10/24 97.7 °F (36.5 °C) (Oral)   24 97.8 °F (36.6 °C) (Temporal)   23 97.4 °F (36.3 °C) (Skin)     BP Readings from Last 3 Encounters:   05/10/24 122/78   24 136/88   23 (!) 148/83     Pulse Readings from Last 3 Encounters:   05/10/24 85   24 82   23 77           Coordination of Care Questionnaire:  :   1. \"Have you been to the ER, urgent care clinic since your last visit?  Hospitalized since your last visit?\" no    2. \"Have you seen or consulted any other health care providers outside of the [facility] since your last visit?\" no     3. For patients aged 45-75: Has the patient had a colonoscopy / FIT/ Cologuard? no      If the patient is female:    4. For patients aged 40-74: Has the patient had a mammogram within the past 2 years? no      5. For patients aged 21-65: Has the patient had a pap smear? no     3) Do you have an Advance Directive on file? no  Are you interested in receiving information about Advance Directives? no    Patient is accompanied by self I have received verbal consent from Bhavya Davis to discuss any/all medical information while they are present in the room.

## 2025-03-14 ENCOUNTER — RESULTS FOLLOW-UP (OUTPATIENT)
Facility: CLINIC | Age: 70
End: 2025-03-14

## 2025-03-27 DIAGNOSIS — E78.2 MIXED HYPERLIPIDEMIA: Primary | ICD-10-CM

## 2025-03-27 RX ORDER — ATORVASTATIN CALCIUM 20 MG/1
20 TABLET, FILM COATED ORAL DAILY
Qty: 90 TABLET | Refills: 0 | Status: SHIPPED | OUTPATIENT
Start: 2025-03-27

## 2025-04-10 ENCOUNTER — TRANSCRIBE ORDERS (OUTPATIENT)
Facility: HOSPITAL | Age: 70
End: 2025-04-10

## 2025-04-10 DIAGNOSIS — Z12.31 OTHER SCREENING MAMMOGRAM: Primary | ICD-10-CM

## 2025-05-01 DIAGNOSIS — J30.9 ALLERGIC RHINITIS, UNSPECIFIED SEASONALITY, UNSPECIFIED TRIGGER: ICD-10-CM

## 2025-05-01 RX ORDER — FLUTICASONE PROPIONATE 50 MCG
2 SPRAY, SUSPENSION (ML) NASAL DAILY PRN
Qty: 1 EACH | Refills: 3 | Status: SHIPPED | OUTPATIENT
Start: 2025-05-01

## 2025-05-09 ENCOUNTER — HOSPITAL ENCOUNTER (OUTPATIENT)
Facility: HOSPITAL | Age: 70
Discharge: HOME | End: 2025-05-12
Attending: STUDENT IN AN ORGANIZED HEALTH CARE EDUCATION/TRAINING PROGRAM
Payer: MEDICARE

## 2025-05-09 VITALS — WEIGHT: 250 LBS | BODY MASS INDEX: 39.16 KG/M2

## 2025-05-09 DIAGNOSIS — Z12.31 OTHER SCREENING MAMMOGRAM: ICD-10-CM

## 2025-05-09 PROCEDURE — 77067 SCR MAMMO BI INCL CAD: CPT

## 2025-06-02 DIAGNOSIS — F41.9 ANXIETY: ICD-10-CM

## 2025-06-03 RX ORDER — SERTRALINE HYDROCHLORIDE 100 MG/1
100 TABLET, FILM COATED ORAL DAILY
Qty: 90 TABLET | Refills: 1 | Status: SHIPPED | OUTPATIENT
Start: 2025-06-03

## 2025-06-24 DIAGNOSIS — E78.2 MIXED HYPERLIPIDEMIA: ICD-10-CM

## 2025-06-24 RX ORDER — ATORVASTATIN CALCIUM 20 MG/1
20 TABLET, FILM COATED ORAL DAILY
Qty: 90 TABLET | Refills: 0 | Status: SHIPPED | OUTPATIENT
Start: 2025-06-24

## 2025-07-09 ENCOUNTER — OFFICE VISIT (OUTPATIENT)
Facility: CLINIC | Age: 70
End: 2025-07-09

## 2025-07-09 VITALS
HEIGHT: 67 IN | HEART RATE: 80 BPM | RESPIRATION RATE: 17 BRPM | DIASTOLIC BLOOD PRESSURE: 84 MMHG | BODY MASS INDEX: 38.55 KG/M2 | OXYGEN SATURATION: 97 % | SYSTOLIC BLOOD PRESSURE: 136 MMHG | TEMPERATURE: 97 F | WEIGHT: 245.6 LBS

## 2025-07-09 DIAGNOSIS — Z00.00 MEDICARE ANNUAL WELLNESS VISIT, SUBSEQUENT: Primary | ICD-10-CM

## 2025-07-09 DIAGNOSIS — F41.9 ANXIETY: ICD-10-CM

## 2025-07-09 DIAGNOSIS — E78.2 MIXED HYPERLIPIDEMIA: ICD-10-CM

## 2025-07-09 DIAGNOSIS — I10 ESSENTIAL (PRIMARY) HYPERTENSION: ICD-10-CM

## 2025-07-09 ASSESSMENT — PATIENT HEALTH QUESTIONNAIRE - PHQ9
3. TROUBLE FALLING OR STAYING ASLEEP: NOT AT ALL
SUM OF ALL RESPONSES TO PHQ QUESTIONS 1-9: 0
5. POOR APPETITE OR OVEREATING: NOT AT ALL
4. FEELING TIRED OR HAVING LITTLE ENERGY: NOT AT ALL
2. FEELING DOWN, DEPRESSED OR HOPELESS: NOT AT ALL
9. THOUGHTS THAT YOU WOULD BE BETTER OFF DEAD, OR OF HURTING YOURSELF: NOT AT ALL
SUM OF ALL RESPONSES TO PHQ QUESTIONS 1-9: 0
1. LITTLE INTEREST OR PLEASURE IN DOING THINGS: NOT AT ALL
10. IF YOU CHECKED OFF ANY PROBLEMS, HOW DIFFICULT HAVE THESE PROBLEMS MADE IT FOR YOU TO DO YOUR WORK, TAKE CARE OF THINGS AT HOME, OR GET ALONG WITH OTHER PEOPLE: NOT DIFFICULT AT ALL
SUM OF ALL RESPONSES TO PHQ QUESTIONS 1-9: 0
6. FEELING BAD ABOUT YOURSELF - OR THAT YOU ARE A FAILURE OR HAVE LET YOURSELF OR YOUR FAMILY DOWN: NOT AT ALL
8. MOVING OR SPEAKING SO SLOWLY THAT OTHER PEOPLE COULD HAVE NOTICED. OR THE OPPOSITE, BEING SO FIGETY OR RESTLESS THAT YOU HAVE BEEN MOVING AROUND A LOT MORE THAN USUAL: NOT AT ALL
SUM OF ALL RESPONSES TO PHQ QUESTIONS 1-9: 0
7. TROUBLE CONCENTRATING ON THINGS, SUCH AS READING THE NEWSPAPER OR WATCHING TELEVISION: NOT AT ALL

## 2025-07-09 ASSESSMENT — LIFESTYLE VARIABLES
HOW OFTEN DO YOU HAVE A DRINK CONTAINING ALCOHOL: NEVER
HOW MANY STANDARD DRINKS CONTAINING ALCOHOL DO YOU HAVE ON A TYPICAL DAY: PATIENT DOES NOT DRINK
HOW OFTEN DO YOU HAVE A DRINK CONTAINING ALCOHOL: MONTHLY OR LESS
HOW MANY STANDARD DRINKS CONTAINING ALCOHOL DO YOU HAVE ON A TYPICAL DAY: PATIENT DOES NOT DRINK

## 2025-07-09 ASSESSMENT — ENCOUNTER SYMPTOMS
RHINORRHEA: 0
SHORTNESS OF BREATH: 0
VOMITING: 0
COUGH: 0
NAUSEA: 0
ABDOMINAL PAIN: 0

## 2025-07-09 NOTE — PATIENT INSTRUCTIONS
doctor if you have any problems.  Where can you learn more?  Go to [email or web address] and enter F075 to learn more about \"A Healthy Heart: Care Instructions.\"  Current as of: July 31, 2024  Content Version: 14.5  © 2100-6564 [employer].   Care instructions adapted under license by [employer]. If you have questions about a medical condition or this instruction, always ask your healthcare professional. [employer] disclaims any warranty or liability for your use of this information.    Personalized Preventive Plan for Bhavya Davis - 7/9/2025  Medicare offers a range of preventive health benefits. Some of the tests and screenings are paid in full while other may be subject to a deductible, co-insurance, and/or copay.  Some of these benefits include a comprehensive review of your medical history including lifestyle, illnesses that may run in your family, and various assessments and screenings as appropriate.  After reviewing your medical record and screening and assessments performed today your provider may have ordered immunizations, labs, imaging, and/or referrals for you.  A list of these orders (if applicable) as well as your Preventive Care list are included within your After Visit Summary for your review.

## 2025-07-09 NOTE — PROGRESS NOTES
dentified pt with two pt identifiers(name and ).    Chief Complaint   Patient presents with    Medicare AWV     Patient here for a Medicare Wellness visit.        Health Maintenance Due   Topic    DTaP/Tdap/Td vaccine (1 - Tdap)    Colorectal Cancer Screen     Respiratory Syncytial Virus (RSV) Pregnant or age 60 yrs+ (1 - Risk 60-74 years 1-dose series)    Pneumococcal 50+ years Vaccine (2 of 2 - PPSV23)    COVID-19 Vaccine (3 -  season)    Annual Wellness Visit (Medicare)        Wt Readings from Last 3 Encounters:   25 113.4 kg (250 lb)   25 113 kg (249 lb 3.2 oz)   05/10/24 111.1 kg (245 lb)     Temp Readings from Last 3 Encounters:   25 97.9 °F (36.6 °C) (Temporal)   05/10/24 97.7 °F (36.5 °C) (Oral)     BP Readings from Last 3 Encounters:   25 127/82   05/10/24 122/78   24 136/88     Pulse Readings from Last 3 Encounters:   25 89   05/10/24 85   24 82           Coordination of Care Questionnaire:  :   1. \"Have you been to the ER, urgent care clinic since your last visit?  Hospitalized since your last visit?\" no    2. \"Have you seen or consulted any other health care providers outside of the [facility] since your last visit?\" no     3. For patients aged 45-75: Has the patient had a colonoscopy / FIT/ Cologuard? Yes 22      If the patient is female:    4. For patients aged 40-74: Has the patient had a mammogram within the past 2 years? yes      5. For patients aged 21-65: Has the patient had a pap smear? no     3) Do you have an Advance Directive on file? yes  Are you interested in receiving information about Advance Directives? no    Patient is accompanied by self I have received verbal consent from hBavya Davis to discuss any/all medical information while they are present in the room.   
in 2022 and is due in 2027.  Awaiting results.          ROS:   Review of Systems   Constitutional:  Negative for chills and fever.   HENT:  Negative for rhinorrhea.    Respiratory:  Negative for cough and shortness of breath.    Cardiovascular:  Negative for chest pain and leg swelling.   Gastrointestinal:  Negative for abdominal pain, nausea and vomiting.   Neurological:  Negative for dizziness, weakness, numbness and headaches.         Objective:     Vitals:    07/09/25 0923   BP: 136/84   Pulse:    Resp:    Temp:    SpO2:       /84   Pulse 80   Temp 97 °F (36.1 °C) (Temporal)   Resp 17   Ht 1.702 m (5' 7\")   Wt 111.4 kg (245 lb 9.6 oz)   SpO2 97%   BMI 38.47 kg/m²       Vitals and Nurse Documentation reviewed.     Physical Exam  Constitutional:       General: She is not in acute distress.     Appearance: Normal appearance. She is obese. She is not ill-appearing.   HENT:      Head: Normocephalic and atraumatic.   Eyes:      Conjunctiva/sclera: Conjunctivae normal.   Cardiovascular:      Rate and Rhythm: Normal rate and regular rhythm.      Heart sounds: Normal heart sounds. No murmur heard.     No friction rub. No gallop.   Pulmonary:      Effort: Pulmonary effort is normal. No respiratory distress.      Breath sounds: Normal breath sounds. No wheezing, rhonchi or rales.   Abdominal:      General: Bowel sounds are normal. There is no distension.      Palpations: Abdomen is soft.      Tenderness: There is no abdominal tenderness. There is no guarding or rebound.   Musculoskeletal:      Cervical back: Neck supple.      Right lower leg: No edema.      Left lower leg: No edema.   Neurological:      Mental Status: She is alert and oriented to person, place, and time.      Gait: Gait normal.   Psychiatric:         Attention and Perception: Attention and perception normal.         Mood and Affect: Mood and affect normal.         Speech: Speech normal.         Behavior: Behavior normal. Behavior is

## 2025-07-10 LAB
ALBUMIN SERPL-MCNC: 4 G/DL (ref 3.5–5)
ALBUMIN/GLOB SERPL: 1.3 (ref 1.1–2.2)
ALP SERPL-CCNC: 100 U/L (ref 45–117)
ALT SERPL-CCNC: 37 U/L (ref 12–78)
ANION GAP SERPL CALC-SCNC: 7 MMOL/L (ref 2–12)
AST SERPL-CCNC: 20 U/L (ref 15–37)
BILIRUB SERPL-MCNC: 0.5 MG/DL (ref 0.2–1)
BUN SERPL-MCNC: 15 MG/DL (ref 6–20)
BUN/CREAT SERPL: 21 (ref 12–20)
CALCIUM SERPL-MCNC: 9.3 MG/DL (ref 8.5–10.1)
CHLORIDE SERPL-SCNC: 107 MMOL/L (ref 97–108)
CHOLEST SERPL-MCNC: 195 MG/DL
CO2 SERPL-SCNC: 26 MMOL/L (ref 21–32)
CREAT SERPL-MCNC: 0.73 MG/DL (ref 0.55–1.02)
GLOBULIN SER CALC-MCNC: 3 G/DL (ref 2–4)
GLUCOSE SERPL-MCNC: 98 MG/DL (ref 65–100)
HDLC SERPL-MCNC: 42 MG/DL
HDLC SERPL: 4.6 (ref 0–5)
LDLC SERPL CALC-MCNC: 129.2 MG/DL (ref 0–100)
POTASSIUM SERPL-SCNC: 4.4 MMOL/L (ref 3.5–5.1)
PROT SERPL-MCNC: 7 G/DL (ref 6.4–8.2)
SODIUM SERPL-SCNC: 140 MMOL/L (ref 136–145)
TRIGL SERPL-MCNC: 119 MG/DL
VLDLC SERPL CALC-MCNC: 23.8 MG/DL

## 2025-07-13 ENCOUNTER — RESULTS FOLLOW-UP (OUTPATIENT)
Facility: CLINIC | Age: 70
End: 2025-07-13

## 2025-08-26 ENCOUNTER — COMMUNITY OUTREACH (OUTPATIENT)
Facility: CLINIC | Age: 70
End: 2025-08-26

## 2025-09-05 DIAGNOSIS — I10 ESSENTIAL (PRIMARY) HYPERTENSION: ICD-10-CM

## 2025-09-05 RX ORDER — LOSARTAN POTASSIUM 50 MG/1
50 TABLET ORAL DAILY
Qty: 90 TABLET | Refills: 1 | Status: SHIPPED | OUTPATIENT
Start: 2025-09-05